# Patient Record
Sex: FEMALE | Race: WHITE | NOT HISPANIC OR LATINO | Employment: FULL TIME | URBAN - METROPOLITAN AREA
[De-identification: names, ages, dates, MRNs, and addresses within clinical notes are randomized per-mention and may not be internally consistent; named-entity substitution may affect disease eponyms.]

---

## 2017-02-14 ENCOUNTER — OFFICE VISIT (OUTPATIENT)
Dept: PSYCHIATRY | Facility: CLINIC | Age: 32
End: 2017-02-14
Payer: COMMERCIAL

## 2017-02-14 VITALS
HEIGHT: 64 IN | SYSTOLIC BLOOD PRESSURE: 132 MMHG | BODY MASS INDEX: 29.37 KG/M2 | HEART RATE: 105 BPM | DIASTOLIC BLOOD PRESSURE: 81 MMHG | WEIGHT: 172 LBS

## 2017-02-14 DIAGNOSIS — F90.0 ADHD, PREDOMINANTLY INATTENTIVE TYPE: Primary | ICD-10-CM

## 2017-02-14 DIAGNOSIS — F43.23 ADJUSTMENT DISORDER WITH MIXED ANXIETY AND DEPRESSED MOOD: ICD-10-CM

## 2017-02-14 DIAGNOSIS — F32.A DEPRESSION, UNSPECIFIED DEPRESSION TYPE: ICD-10-CM

## 2017-02-14 DIAGNOSIS — F41.9 ANXIETY: ICD-10-CM

## 2017-02-14 DIAGNOSIS — F32.81 PMDD (PREMENSTRUAL DYSPHORIC DISORDER): ICD-10-CM

## 2017-02-14 PROCEDURE — 99999 PR PBB SHADOW E&M-EST. PATIENT-LVL III: CPT | Mod: PBBFAC,,, | Performed by: PSYCHIATRY & NEUROLOGY

## 2017-02-14 PROCEDURE — 99214 OFFICE O/P EST MOD 30 MIN: CPT | Mod: S$GLB,,, | Performed by: PSYCHIATRY & NEUROLOGY

## 2017-02-14 RX ORDER — ALBUTEROL SULFATE 108 UG/1
AEROSOL, METERED RESPIRATORY (INHALATION)
Refills: 1 | COMMUNITY
Start: 2016-12-15 | End: 2017-06-26

## 2017-02-14 RX ORDER — METHYLPHENIDATE HYDROCHLORIDE 10 MG/1
10 TABLET ORAL 2 TIMES DAILY
Qty: 60 TABLET | Refills: 0 | Status: SHIPPED | OUTPATIENT
Start: 2017-03-15 | End: 2017-05-04 | Stop reason: SDUPTHER

## 2017-02-14 RX ORDER — OMEPRAZOLE 40 MG/1
1 CAPSULE, DELAYED RELEASE ORAL DAILY
Refills: 2 | COMMUNITY
Start: 2017-02-06

## 2017-02-14 RX ORDER — FLUTICASONE PROPIONATE 50 MCG
2 SPRAY, SUSPENSION (ML) NASAL 2 TIMES DAILY
Refills: 3 | COMMUNITY
Start: 2017-01-25

## 2017-02-14 RX ORDER — VENLAFAXINE HYDROCHLORIDE 75 MG/1
CAPSULE, EXTENDED RELEASE ORAL
Qty: 90 CAPSULE | Refills: 1 | Status: SHIPPED | OUTPATIENT
Start: 2017-02-14 | End: 2017-05-04 | Stop reason: SDUPTHER

## 2017-02-14 NOTE — PROGRESS NOTES
"Outpatient Psychiatry Follow-Up Visit (MD/NP)    2017    Clinical Status of Patient:  Outpatient (Ambulatory)     Session Length:  30 mins (E&M)     Chief Complaint:  Caridad Panda is a 31 y.o. female who presents today for follow-up of anxiety and adjustment problems.    Met with patient.      Interval History and Content of Current Session:  Interim Events/Subjective Report/Content of Current Session:  First f/u with me since appt on 2016 -- please see my note in pt's Epic medical record.      "I am doing OK".  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  She commutes to 2 different sites in [facility] ([facility] and [place]).  She states there is a head dentist and 2 associate dentists.      She has put off taking the DONNA (dental school equivalent of the MCAT).  She states she did not think she was ready to take it.    She states she has also NOT taken the Ritalin yet -- she stated she wanted to see how she would do (at work) without it.  She also has not been studying -- "getting started is so hard".  She has always a hard time concentrating/focusing on studying.  She has denied ever taking a psychostimulant in the past, though her Hx is certainly very suggestive for ADHD, inattentive type (see self report scale from today).  I encouraged her to try the Ritalin 10 mg IR TWICE DAILY, particularly since she needs to study and sit for the DONNA.       Also, her roommate decided to move out last month.  She did not get much notice.  She has been trying to find a new roommate, as she will have a hard time meeting rent.    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      Pt has denied any personal Hx of heart disease.  Regarding family cardiac Hx: her mom has Hx of a-fib and her maternal gm had a recent TIA.    Her father  from an MI in his " 40's and her paternal gf  from MI in his 50's.     Her brother has been evaluated by a cardiologist as a precaution because of family history (he currently has no signs of heart disease).      She continues to take the Effexor XR 75 mg capsule nightly.  She notes compliance.  If she forgets to take the dose the night before, she will have a bad migraine with nausea the next AM.    She also has PMDD -- gets more anxious, emotionally reactive for a few days prior to menses each month.  She takes Prozac 20 mg on those days and thinks it helps (averages 1 week out of 4 that she takes the Prozac).      She has rarely used Vistaril 25 mg prn anxiety.  She thinks it did help when taking it before and did not make her groggy.  She prefers to take this med for anxiety in the future.      She had met with a SW (Estephanie Dunaway [facility]; #[phone]) for psychotherapy (not meeting with her recently due to cost).         Psychotherapy:  N/A.      Review of Systems   GENERAL:  Some recent weight gain   SKIN:  No rashes or lacerations  HEAD:  No headaches  EYES:  No exophthalmos, jaundice or blindness  EARS:  No dizziness, tinnitus or hearing loss  NOSE:  No changes in smell  MOUTH & THROAT:  No dyskinetic movements or obvious goiter  CHEST:  No shortness of breath, hyperventilation or cough  CARDIOVASCULAR:  No tachycardia or chest pain  ABDOMEN:  No nausea, vomiting, pain, constipation or diarrhea  URINARY:  No frequency, dysuria or sexual dysfunction  ENDOCRINE:  No polydipsia, polyuria  MUSCULOSKELETAL:  No pain or stiffness of the joints  NEUROLOGIC:  No weakness, sensory changes, seizures, confusion, memory loss, tremor or other abnormal movements.     Past Psychiatric History:    Took Prozac and Zoloft in past (Prozac as a teen with ADHD).  Also, tried Lexapro, but this med did not work.    She states she has never taken a medication for ADHD.      Past Medical, Family and Social History: The patient's past medical, family  "and social history have been reviewed and updated as appropriate within the electronic medical record -- see encounter notes.    Current Psychotropic Medications:    Effexor XR 75 mg daily  Prozac 20 mg daily as needed (FOR PMDD; TAKES 1 OUT OF 4 WEEKS)   Vistaril 25 mg QID prn anxiety   Ritalin 10 mg IR BID (SEE ABOVE)     Compliance:  Not fully; see above.      Side effects: None    Risk Parameters:  Patient reports no suicidal ideation  Patient reports no homicidal ideation  Patient reports no self-injurious behavior  Patient reports no violent behavior    Exam (detailed: at least 9 elements; comprehensive: all 15 elements)   Constitutional  Vitals:  Most recent vital signs, dated less than 90 days prior to this appointment, were reviewed.     Vitals - 1 value per visit 9/8/2016 12/8/2016 2/14/2017   SYSTOLIC 130 133 132   DIASTOLIC 75 82 81   PULSE 113 109 105   Weight (lb) 157 173 172   HEIGHT 5' 4" 5' 4" 5' 4"   BODY MASS INDEX 26.95 29.7 29.52   VISIT REPORT      Pain Score           Vitals - 1 value per visit 6/16/2015 7/30/2015 4/26/2016   SYSTOLIC 153 140 120   DIASTOLIC 78 73 78   PULSE 106 96    Weight (lb) 172 168.6 161   HEIGHT 5' 4" 5' 3" 5' 4"   BODY MASS INDEX 29.51 29.87 27.64   VISIT REPORT      Pain Score   0 0        General:  unremarkable, age appropriate, well nourished, casually dressed, neatly groomed, pleasant, cooperative, good eye contact, engages well in conversation     Musculoskeletal  Muscle Strength/Tone:  not examined   Gait & Station:  non-ataxic     Psychiatric  Speech:  no latency; no press, spontaneous, good articulation   Mood & Affect:  anxious  congruent and appropriate   Thought Process:  goal-directed, mildly circumstantial   Associations:  mildly circumstantial   Thought Content:  normal, no suicidality, no homicidality, delusions, or paranoia   Insight:  intact, has awareness of illness   Judgement: behavior is adequate to circumstances   Orientation:  grossly intact " "  Memory: intact for content of interview   Language: grossly intact   Attention Span & Concentration:  able to focus   Fund of Knowledge:  intact and appropriate to age and level of education     Assessment and Diagnosis   Status/Progress: Based on the examination today, the patient's problem(s) is/are adequately but not ideally controlled.  New problems have been presented today.   Co-morbidities are not complicating management of the primary condition.  There are no active rule-out diagnoses for this patient at this time.     General Impression:   ADHD, inattentive type  Anxiety Disorder, unspecified  PMDD  Depressive Disorder, unspecified (R/O Hx of dysthymic disorder or MDD)     Intervention/Counseling/Treatment Plan   Medication Management:  I again encouraged pt to try the Ritalin IR 10 mg TWICE DAILY for concentration.  Risks and benefits of psychostimulants were noted, including potential increased anxiety/agitation, tremor, initial insomnia, potential rapid cycling of mood in a bipolar patient, and possible tolerance and dependence to develop.  Also, strict LEIGH regulations for this medication were noted, as well as recommendation to keep the medication secured, as no Rx would be written for patient if the Rx or supply of medication were lost or stolen.  One Rx for Ritalin, for a 30 day supply with no refills & with "Do not fill until" date posted accordingly, was given to patient (pt has a full bottle of this med she has yet to take).   --Continue Prozac 20 mg one capsule daily PRN for PMDD.    --Continue other current medications (Effexor XR, PRN Vistaril).     Return to Clinic: 3 months, or sooner prn.    "

## 2017-05-04 ENCOUNTER — OFFICE VISIT (OUTPATIENT)
Dept: PSYCHIATRY | Facility: CLINIC | Age: 32
End: 2017-05-04
Payer: COMMERCIAL

## 2017-05-04 VITALS
WEIGHT: 178 LBS | DIASTOLIC BLOOD PRESSURE: 80 MMHG | SYSTOLIC BLOOD PRESSURE: 130 MMHG | HEIGHT: 64 IN | BODY MASS INDEX: 30.39 KG/M2 | HEART RATE: 94 BPM

## 2017-05-04 DIAGNOSIS — F32.81 PMDD (PREMENSTRUAL DYSPHORIC DISORDER): ICD-10-CM

## 2017-05-04 DIAGNOSIS — F41.9 ANXIETY: ICD-10-CM

## 2017-05-04 DIAGNOSIS — F32.A DEPRESSION, UNSPECIFIED DEPRESSION TYPE: ICD-10-CM

## 2017-05-04 DIAGNOSIS — F90.0 ADHD, PREDOMINANTLY INATTENTIVE TYPE: Primary | ICD-10-CM

## 2017-05-04 PROCEDURE — 99214 OFFICE O/P EST MOD 30 MIN: CPT | Mod: S$GLB,,, | Performed by: PSYCHIATRY & NEUROLOGY

## 2017-05-04 PROCEDURE — 99999 PR PBB SHADOW E&M-EST. PATIENT-LVL III: CPT | Mod: PBBFAC,,, | Performed by: PSYCHIATRY & NEUROLOGY

## 2017-05-04 PROCEDURE — 1160F RVW MEDS BY RX/DR IN RCRD: CPT | Mod: S$GLB,,, | Performed by: PSYCHIATRY & NEUROLOGY

## 2017-05-04 RX ORDER — METHYLPHENIDATE HYDROCHLORIDE 10 MG/1
10 TABLET ORAL 2 TIMES DAILY
Qty: 60 TABLET | Refills: 0 | Status: SHIPPED | OUTPATIENT
Start: 2017-06-03 | End: 2017-07-19 | Stop reason: SDUPTHER

## 2017-05-04 RX ORDER — METHYLPHENIDATE HYDROCHLORIDE 10 MG/1
10 TABLET ORAL 2 TIMES DAILY
Qty: 60 TABLET | Refills: 0 | Status: SHIPPED | OUTPATIENT
Start: 2017-05-04 | End: 2017-07-19

## 2017-05-04 RX ORDER — VENLAFAXINE HYDROCHLORIDE 75 MG/1
CAPSULE, EXTENDED RELEASE ORAL
Qty: 90 CAPSULE | Refills: 1 | Status: SHIPPED | OUTPATIENT
Start: 2017-05-04 | End: 2017-07-19 | Stop reason: SDUPTHER

## 2017-05-04 NOTE — PROGRESS NOTES
"Outpatient Psychiatry Follow-Up Visit (MD/NP)    5/4/2017    Clinical Status of Patient:  Outpatient (Ambulatory)     Session Length:  30 mins (E&M)     Chief Complaint:  Caridad Panda is a 31 y.o. female who presents today for follow-up of anxiety and adjustment problems.    Met with patient.      Interval History and Content of Current Session:  Interim Events/Subjective Report/Content of Current Session:  First f/u with me since appt on 2/14/2017 -- please see my note in pt's Epic medical record.    Generally doing OK.      She states she has been trying the Ritalin -- she thinks it helps with concentration.    She states she cannot take it after 5 pm because it tends to keep her awake.  It also tends to suppress appetite.    However, she will take it earlier to help her fall asleep     She plans to see a neurologist (Dr. Monahan at [facility]) to help manage her migraine headaches.  She states she had her first migraine headache in 4th grade because of the CA leap test.    At times she has had severe migraines, has had to take ibuprofen, lie down and sleep to break it.      She is studying for the DONNA; she has a study schedule and has signed up for DONAN "boot camp" that is on line.  It has practice tests as well.    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  She commutes to 2 different sites in [facility] ([facility] and [place]).  She states there is a head dentist and 2 associate dentists.      She continues to take the Effexor XR 75 mg capsule nightly.  She notes compliance.  If she forgets to take the dose the night before, she will have a bad migraine with nausea the next AM.    She also has PMDD -- gets more anxious, emotionally reactive for a few days prior to menses each month.  She takes Prozac 20 mg on those days and thinks it helps (averages 1 week out of 4 that she takes the Prozac). "    She has rarely used Vistaril 25 mg prn anxiety.  She thinks it helps and does not make her groggy.  She prefers to take this med for anxiety in the future.      Pt has denied any personal Hx of heart disease.  Regarding family cardiac Hx: her mom has Hx of a-fib and her maternal gm had a recent TIA.    Her father  from an MI in his 40's and her paternal gf  from MI in his 50's.     Her brother has been evaluated by a cardiologist as a precaution because of family history (he currently has no signs of heart disease).      She had met with a SW (Estephanie Dunaway [facility]; #[phone]) for psychotherapy (not meeting with her recently due to cost).         Psychotherapy:  N/A.      Review of Systems   GENERAL:  Some recent weight gain   SKIN:  No rashes or lacerations  HEAD:  No headaches  EYES:  No exophthalmos, jaundice or blindness  EARS:  No dizziness, tinnitus or hearing loss  NOSE:  No changes in smell  MOUTH & THROAT:  No dyskinetic movements or obvious goiter  CHEST:  No shortness of breath, hyperventilation or cough  CARDIOVASCULAR:  No tachycardia or chest pain  ABDOMEN:  No nausea, vomiting, pain, constipation or diarrhea  URINARY:  No frequency, dysuria or sexual dysfunction  ENDOCRINE:  No polydipsia, polyuria  MUSCULOSKELETAL:  No pain or stiffness of the joints  NEUROLOGIC:  No weakness, sensory changes, seizures, confusion, memory loss, tremor or other abnormal movements.     Past Psychiatric History:    Took Prozac and Zoloft in past (Prozac as a teen with ADHD).  Also, tried Lexapro, but this med did not work.      Past Medical, Family and Social History: The patient's past medical, family and social history have been reviewed and updated as appropriate within the electronic medical record -- see encounter notes.    Current Psychotropic Medications:    Effexor XR 75 mg daily  Prozac 20 mg daily as needed (FOR PMDD; TAKES 1 OUT OF 4 WEEKS)   Vistaril 25 mg QID prn anxiety   Ritalin 10 mg IR BID  "(TAKES PRN FOR INATTENTION)    Compliance:  Yes.      Side effects: None    Risk Parameters:  Patient reports no suicidal ideation  Patient reports no homicidal ideation  Patient reports no self-injurious behavior  Patient reports no violent behavior    Exam (detailed: at least 9 elements; comprehensive: all 15 elements)   Constitutional  Vitals:  Most recent vital signs, dated less than 90 days prior to this appointment, were reviewed.     Vitals - 1 value per visit 12/8/2016 2/14/2017 5/4/2017   SYSTOLIC 133 132 130   DIASTOLIC 82 81 80   PULSE 109 105 94   Weight (lb) 173 172 178   Weight (kg) 78.472 78.019 80.74   HEIGHT 5' 4" 5' 4" 5' 4"   BODY MASS INDEX 29.7 29.52 30.55   VISIT REPORT      Pain Score           Vitals - 1 value per visit 6/16/2015 7/30/2015 4/26/2016   SYSTOLIC 153 140 120   DIASTOLIC 78 73 78   PULSE 106 96    Weight (lb) 172 168.6 161   HEIGHT 5' 4" 5' 3" 5' 4"   BODY MASS INDEX 29.51 29.87 27.64   VISIT REPORT      Pain Score   0 0        General:  unremarkable, age appropriate, well nourished, casually dressed, neatly groomed, pleasant, cooperative, good eye contact, engages well in conversation     Musculoskeletal  Muscle Strength/Tone:  not examined   Gait & Station:  non-ataxic     Psychiatric  Speech:  no latency; no press, spontaneous, good articulation   Mood & Affect:  anxious  congruent and appropriate   Thought Process:  goal-directed, mildly circumstantial   Associations:  mildly circumstantial   Thought Content:  normal, no suicidality, no homicidality, delusions, or paranoia   Insight:  intact, has awareness of illness   Judgement: behavior is adequate to circumstances   Orientation:  grossly intact   Memory: intact for content of interview   Language: grossly intact   Attention Span & Concentration:  able to focus   Fund of Knowledge:  intact and appropriate to age and level of education     Assessment and Diagnosis   Status/Progress: Based on the examination today, the " "patient's problem(s) is/are adequately but not ideally controlled.  New problems have been presented today.   Co-morbidities are not complicating management of the primary condition.  There are no active rule-out diagnoses for this patient at this time.     General Impression:   ADHD, inattentive type  Anxiety Disorder, unspecified  PMDD  Depressive Disorder, unspecified (R/O Hx of dysthymic disorder or MDD)     Intervention/Counseling/Treatment Plan   Medication Management:  Continue Ritalin IR 10 mg TWICE DAILY for concentration.  Risks and benefits of psychostimulants were noted, including potential increased anxiety/agitation, tremor, initial insomnia, potential rapid cycling of mood in a bipolar patient, and possible tolerance and dependence to develop.  Also, strict LEIGH regulations for this medication were noted, as well as recommendation to keep the medication secured, as no Rx would be written for patient if the Rx or supply of medication were lost or stolen.  One Rx for Ritalin, for a 30 day supply with no refills & with "Do not fill until" date posted accordingly, was given to patient (pt has a full bottle of this med she has yet to take).   --Continue Prozac 20 mg one capsule daily PRN for PMDD.    --Continue other current medications (Effexor XR, PRN Vistaril).     Return to Clinic: 3 months, or sooner prn.    "

## 2017-05-26 ENCOUNTER — PATIENT MESSAGE (OUTPATIENT)
Dept: OBSTETRICS AND GYNECOLOGY | Facility: CLINIC | Age: 32
End: 2017-05-26

## 2017-06-26 ENCOUNTER — OFFICE VISIT (OUTPATIENT)
Dept: OBSTETRICS AND GYNECOLOGY | Facility: CLINIC | Age: 32
End: 2017-06-26
Payer: COMMERCIAL

## 2017-06-26 VITALS
DIASTOLIC BLOOD PRESSURE: 76 MMHG | SYSTOLIC BLOOD PRESSURE: 118 MMHG | BODY MASS INDEX: 30.48 KG/M2 | HEIGHT: 64 IN | WEIGHT: 178.56 LBS

## 2017-06-26 DIAGNOSIS — Z01.419 VISIT FOR GYNECOLOGIC EXAMINATION: Primary | ICD-10-CM

## 2017-06-26 DIAGNOSIS — N90.89 VULVAR LESION: ICD-10-CM

## 2017-06-26 DIAGNOSIS — Z30.41 ENCOUNTER FOR SURVEILLANCE OF CONTRACEPTIVE PILLS: ICD-10-CM

## 2017-06-26 PROCEDURE — 99395 PREV VISIT EST AGE 18-39: CPT | Mod: S$GLB,,, | Performed by: OBSTETRICS & GYNECOLOGY

## 2017-06-26 PROCEDURE — 99999 PR PBB SHADOW E&M-EST. PATIENT-LVL II: CPT | Mod: PBBFAC,,, | Performed by: OBSTETRICS & GYNECOLOGY

## 2017-06-26 RX ORDER — ZOLMITRIPTAN 5 MG/1
SPRAY, METERED NASAL
COMMUNITY
Start: 2017-05-08 | End: 2019-05-16

## 2017-06-26 RX ORDER — TOPIRAMATE 25 MG/1
CAPSULE, EXTENDED RELEASE ORAL
COMMUNITY
Start: 2017-06-12

## 2017-06-26 NOTE — PROGRESS NOTES
HISTORY OF PRESENT ILLNESS:    Caridad Panda is a 32 y.o. female, , Patient's last menstrual period was 2017.,  presents for a routine exam and has no complaints. PAP DUE .  REFILL OCP  WANTS TO SCHEDULE REMOVAL OF VULVAR SKIN TAG - HAS TWO, BUT THE .5 CM SKIN TAG AT THE CREASE BETWEEN VULVA AND LEFT THIGH IS NOT VERY SYMPTOMATIC.   ADVISED RE REMOVAL OF .5-1 CM FLESHY SKIN TAG ARISING FROM POSTERIOR PERINEUM SINCE IT CATCHES ON HER CLOTHES AND WOULD ADVISE LEEP SINCE IT IS ON A BROAD FLESHY BASE.  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  WILL REFILL OCP - HAS SOME CRAMPING AND FATIGUE PRIOR TO MENSES, ALSO OCCAS H/A.  ONLY BLEEDS 2 DAYS - OFFERED DIFFERENT OCP OR SEASONAL WITHDRAWAL AND DECLINES FOR NOW BUT WILL NOTIFY ME IF SHE CHANFGES HER MIND     LAST VISIT 2015:  C/O AMENORRHEA ON PILLS THIS MONTH - REASSURED. PAP DUE . REFILL OCP. DENTAL SCHOOL.  LAST VISIT 2013:  NEW PATIENT TO ME, ESTABLISHING CARE. C/O DYSMENORRHEA - DISCUSSED NSAIDS  PAP SUBMITTED AND DISCUSSED 3YR SCREENING INTERVAL RECS  WORKS DENTAL SCHOOL    Past Medical History:   Diagnosis Date    Anxiety     Depression        Past Surgical History:   Procedure Laterality Date    HERNIA REPAIR      PATELA REPAIR         MEDICATIONS AND ALLERGIES:      Current Outpatient Prescriptions:     azelastine (ASTELIN) 137 mcg nasal spray, 1 spray by Nasal route 2 (two) times daily as needed for Rhinitis., Disp: , Rfl:     cetirizine (ZYRTEC) 10 MG tablet, Take 10 mg by mouth once daily., Disp: , Rfl:     fluoxetine (PROZAC) 20 MG capsule, Take 1 capsule (20 mg total) by mouth daily as needed (PMDD)., Disp: 30 capsule, Rfl: 2    fluticasone (FLONASE) 50 mcg/actuation nasal spray, 2 sprays by Each Nare route 2 (two) times daily., Disp: , Rfl: 3    hydrOXYzine pamoate (VISTARIL) 25 MG Cap, Take 1 capsule (25 mg total) by mouth 4 (four)  times daily as needed (anxiety)., Disp: 60 capsule, Rfl: 0    methylphenidate (RITALIN) 10 MG tablet, Take 1 tablet (10 mg total) by mouth 2 (two) times daily., Disp: 60 tablet, Rfl: 0    methylphenidate (RITALIN) 10 MG tablet, Take 1 tablet (10 mg total) by mouth 2 (two) times daily., Disp: 60 tablet, Rfl: 0    norethindrone-ethinyl estradiol (CYCLAFEM 1/35, 28,) 1-35 mg-mcg per tablet, Take 1 tablet by mouth once daily., Disp: 84 tablet, Rfl: 3    TROKENDI XR 25 mg Cp24, , Disp: , Rfl:     venlafaxine (EFFEXOR-XR) 75 MG 24 hr capsule, TAKE 1 CAPSULE (75 MG TOTAL) BY MOUTH ONCE DAILY., Disp: 90 capsule, Rfl: 1    omeprazole (PRILOSEC) 40 MG capsule, Take 1 capsule by mouth once daily., Disp: , Rfl: 2    ZOMIG 5 mg Spry, , Disp: , Rfl:     Review of patient's allergies indicates:   Allergen Reactions    Iodine and iodide containing products Itching and Swelling       Family History   Problem Relation Age of Onset    Heart attacks under age 50 Father     Hypertension Father     Cervical cancer Maternal Grandmother     Hypertension Maternal Grandmother     Cancer Maternal Grandmother     Colon cancer Maternal Uncle     Cancer Maternal Uncle     Atrial fibrillation Mother     Hypertension Mother     Hypertension Brother     Cancer Maternal Aunt     Diabetes Maternal Grandfather     Cancer Maternal Grandfather     Breast cancer Neg Hx        Social History     Social History    Marital status: Single     Spouse name: N/A    Number of children: 0    Years of education: N/A     Occupational History    Not on file.     Social History Main Topics    Smoking status: Never Smoker    Smokeless tobacco: Never Used    Alcohol use Yes      Comment: SOCIALLY, 1.5 pitchers of beer every 2 weeks    Drug use: No    Sexual activity: Yes     Partners: Male     Birth control/ protection: OCP     Other Topics Concern    Not on file     Social History Narrative    No narrative on file       COMPREHENSIVE  "GYN HISTORY:  PAP History: Denies abnormal Paps.  Infection History: Denies STDs. Denies PID.  Benign History: Denies uterine fibroids. Denies ovarian cysts. Denies endometriosis. Denies other conditions.  Cancer History: Denies cervical cancer. Denies uterine cancer or hyperplasia. Denies ovarian cancer. Denies vulvar cancer or pre-cancer. Denies vaginal cancer or pre-cancer. Denies breast cancer. Denies colon cancer.  Sexual Activity History: Reports currently being sexually active  Menstrual History: Monthly, mild-moderate.  Contraception:oral contraceptives (estrogen/progesterone)    ROS:  GENERAL: No weight changes. No swelling. No fatigue. No fever.  CARDIOVASCULAR: No chest pain. No shortness of breath. No leg cramps.   NEUROLOGICAL: No headaches. No vision changes.  BREASTS: No pain. No lumps. No discharge.  ABDOMEN: No pain. No nausea. No vomiting. No diarrhea. No constipation.  REPRODUCTIVE: No abnormal bleeding.   VULVA: No pain. No lesions. No itching.  VAGINA: No relaxation. No itching. No odor. No discharge. No lesions.  URINARY: No incontinence. No nocturia. No frequency. No dysuria.    /76   Ht 5' 4" (1.626 m)   Wt 81 kg (178 lb 9.2 oz)   LMP 06/11/2017   Breastfeeding? Unknown   BMI 30.65 kg/m²     PE:  APPEARANCE: Well nourished, well developed, in no acute distress.  AFFECT: WNL, alert and oriented x 3.  SKIN: No acne or hirsutism.  NECK: Neck symmetric, without masses or thyromegaly.  NODES: No inguinal, cervical, axillary or femoral lymph node enlargement.  CHEST: Good respiratory effort.   ABDOMEN: Soft. No tenderness or masses. No hepatosplenomegaly. No hernias.  BREASTS: Symmetrical, no skin changes, visible lesions, palpable masses or nipple discharge bilaterally.  PELVIC: External female genitalia without lesions.  Female hair distribution. Adequate perineal body, Normal urethral meatus. Vagina moist and well rugated without lesions or discharge.  No significant cystocele or " rectocele present. Cervix pink without lesions, discharge or tenderness. Uterus is normal size, regular, mobile and nontender. Adnexa without masses or tenderness.  EXTREMITIES: No edema    PROCEDURES:      DIAGNOSIS:  1. Visit for gynecologic examination     2. Encounter for surveillance of contraceptive pills     3. Vulvar lesion         LABS AND TESTS ORDERED:    MEDICATIONS PRESCRIBED:    COUNSELING:   The patient was counseled today on ACS PAP guidelines, with recommendations for yearly pelvic exams unless their uterus, cervix, and ovaries were removed for benign reasons; in that case, examinations every 3-5 years are recommended.  The patient was also counseled regarding monthly breast self-examination, routine STD screening for at-risk populations, prophylactic immunizations for transmitted infections such as  HPV, Pertussis, or Influenza as appropriate, and yearly mammograms when indicated by ACS guidelines.  She was advised to see her primary care physician for all other health maintenance.    FOLLOW-UP with me annually.

## 2017-07-19 ENCOUNTER — OFFICE VISIT (OUTPATIENT)
Dept: PSYCHIATRY | Facility: CLINIC | Age: 32
End: 2017-07-19
Payer: COMMERCIAL

## 2017-07-19 VITALS
HEIGHT: 64 IN | DIASTOLIC BLOOD PRESSURE: 86 MMHG | HEART RATE: 105 BPM | BODY MASS INDEX: 30.73 KG/M2 | SYSTOLIC BLOOD PRESSURE: 139 MMHG | WEIGHT: 180 LBS

## 2017-07-19 DIAGNOSIS — F90.0 ATTENTION DEFICIT HYPERACTIVITY DISORDER (ADHD), PREDOMINANTLY INATTENTIVE TYPE: ICD-10-CM

## 2017-07-19 DIAGNOSIS — F51.01 PRIMARY INSOMNIA: ICD-10-CM

## 2017-07-19 DIAGNOSIS — F41.9 ANXIETY: ICD-10-CM

## 2017-07-19 DIAGNOSIS — F32.A DEPRESSION, UNSPECIFIED DEPRESSION TYPE: ICD-10-CM

## 2017-07-19 DIAGNOSIS — F32.81 PMDD (PREMENSTRUAL DYSPHORIC DISORDER): ICD-10-CM

## 2017-07-19 DIAGNOSIS — F90.0 ADHD, PREDOMINANTLY INATTENTIVE TYPE: Primary | ICD-10-CM

## 2017-07-19 DIAGNOSIS — F41.9 ANXIETY DISORDER, UNSPECIFIED TYPE: ICD-10-CM

## 2017-07-19 PROCEDURE — 99999 PR PBB SHADOW E&M-EST. PATIENT-LVL III: CPT | Mod: PBBFAC,,, | Performed by: PSYCHIATRY & NEUROLOGY

## 2017-07-19 PROCEDURE — 99214 OFFICE O/P EST MOD 30 MIN: CPT | Mod: S$GLB,,, | Performed by: PSYCHIATRY & NEUROLOGY

## 2017-07-19 PROCEDURE — 90833 PSYTX W PT W E/M 30 MIN: CPT | Mod: S$GLB,,, | Performed by: PSYCHIATRY & NEUROLOGY

## 2017-07-19 RX ORDER — METHYLPHENIDATE HYDROCHLORIDE 10 MG/1
10 TABLET ORAL 2 TIMES DAILY
Qty: 60 TABLET | Refills: 0 | Status: SHIPPED | OUTPATIENT
Start: 2017-08-18 | End: 2017-10-18 | Stop reason: SDUPTHER

## 2017-07-19 RX ORDER — ZALEPLON 10 MG/1
10 CAPSULE ORAL NIGHTLY
Qty: 30 CAPSULE | Refills: 3 | Status: SHIPPED | OUTPATIENT
Start: 2017-07-19

## 2017-07-19 RX ORDER — VENLAFAXINE HYDROCHLORIDE 75 MG/1
CAPSULE, EXTENDED RELEASE ORAL
Qty: 90 CAPSULE | Refills: 1 | Status: SHIPPED | OUTPATIENT
Start: 2017-07-19 | End: 2017-10-18 | Stop reason: SDUPTHER

## 2017-07-19 RX ORDER — METHYLPHENIDATE HYDROCHLORIDE 10 MG/1
10 TABLET ORAL 2 TIMES DAILY
Qty: 60 TABLET | Refills: 0 | Status: SHIPPED | OUTPATIENT
Start: 2017-07-19 | End: 2017-10-18 | Stop reason: SDUPTHER

## 2017-07-19 RX ORDER — HYDROXYZINE PAMOATE 25 MG/1
25 CAPSULE ORAL 4 TIMES DAILY PRN
Qty: 60 CAPSULE | Refills: 3 | Status: SHIPPED | OUTPATIENT
Start: 2017-07-19 | End: 2018-08-22 | Stop reason: SDUPTHER

## 2017-07-19 RX ORDER — TRAZODONE HYDROCHLORIDE 100 MG/1
100 TABLET ORAL NIGHTLY PRN
Qty: 30 TABLET | Refills: 3 | Status: SHIPPED | OUTPATIENT
Start: 2017-07-19 | End: 2018-01-10

## 2017-07-19 RX ORDER — METHYLPHENIDATE HYDROCHLORIDE 10 MG/1
10 TABLET ORAL 2 TIMES DAILY
Qty: 60 TABLET | Refills: 0 | Status: SHIPPED | OUTPATIENT
Start: 2017-09-17 | End: 2017-10-18 | Stop reason: SDUPTHER

## 2017-07-19 NOTE — PROGRESS NOTES
Outpatient Psychiatry Follow-Up Visit (MD/NP)    7/19/2017    Clinical Status of Patient:  Outpatient (Ambulatory)     Session Length:  45 mins (E&M plus psychotherapy)     Chief Complaint:  Caridad aPnda is a 32 y.o. female who presents today for follow-up of anxiety and adjustment problems.    Met with patient.      Interval History and Content of Current Session:  Interim Events/Subjective Report/Content of Current Session:  First f/u with me since appt on 5/4/2017 -- please see my note in pt's Epic medical record.    Generally doing OK.      She had the DONNA scheduled for 7/29/17.  She pushed it back to 8/29/17 because she did not think she was ready.  She still has trepidation to take the test (fear of failure).  She also plans to submit her application towards 2 dental schools: [employer] and [employer]; she plans to apply even before she gets her DONNA scores back.  She thinks she needs to score at least a 20 out of 30 on the DONNA.  She scored an 18 when she originally took the exam (scores have increased).    Ritalin helps well with concentration and studying.  She denies AE's.      She notes work overall is better, but it is still very difficult.  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  She commutes to 2 different sites in [facility] ([facility] and [place]).  She states there is a head dentist and 2 associate dentists.    When she has a stressful day at work, she is too mentally and physically wiped out to study.    She has had problems falling and staying asleep.  She also has dreams about work; she tends to have anxious dreams.      She notes some of the stress/struggles she has had to deal with at work lately -- self disclosure noted.      She has problems falling asleep.   She also awakens and cannot go back to sleep.  She also awakens multiple times at night due to multiple  reasons.    She also snores, likely has JAVIER.  Her mom and brother also have JAVIER.  She is checking into getting a sleep study -- I encouraged her to do so.      We discussed med options for sleep: could take 2 of the Vistaril 25 mg caps, or 50 mg; or, could try trazodone; or, could try Sonata either at the beginning or in the middle of the night as long as she could sleep for at least 4 hours after dosing.    Pt asked for printed scripts for both the trazodone and Sonata so she could decide which of them she would like to try (or, she could just try Vistaril instead).      She continues to take the Effexor XR 75 mg capsule nightly.  She notes compliance.  If she forgets to take the dose the night before, she will have a bad migraine with nausea the next AM.    She also has PMDD -- gets more anxious, emotionally reactive for a few days prior to menses each month.  She takes Prozac 20 mg on those days and thinks it helps (averages 1 week out of 4 that she takes the Prozac).    She has rarely used Vistaril 25 mg prn anxiety.  She thinks it relax her and does not make her groggy.       Pt has denied any personal Hx of heart disease.  Regarding family cardiac Hx: her mom has Hx of a-fib and her maternal gm had a recent TIA.    Her father  from an MI in his 40's and her paternal gf  from MI in his 50's.     Her brother has been evaluated by a cardiologist as a precaution because of family history (he currently has no signs of heart disease).      She sees Dr. Monahan (neurologist) at [facility] to help manage her migraine headaches.  She states she had her first migraine headache in 4th grade.    At times she has had severe migraines, has had to take ibuprofen, lie down and sleep to break it.      She had met with a EMIR (Estephan[facility]; #[phone]) for psychotherapy (not meeting with her recently due to cost).           PSYCHOTHERAPY ADD-ON [id number]   30 (16-37*) minutes    Site: [facility], [address]  Time: 20 minutes  Participants: Met with patient    Therapeutic Intervention Type: insight oriented psychotherapy, supportive psychotherapy  Why chosen therapy is appropriate versus another modality: relevant to diagnosis, patient responds to this modality    Target symptoms: depression, distractability, lack of focus, anxiety , adjustment, work stress  Primary focus: see above  Psychotherapeutic techniques: encouraged self-disclosure, active listening and feedback, reframing, encouragement of self care    Outcome monitoring methods: self-report, lab data, observation    Patient's response to intervention:  The patient's response to intervention is motivated.    Progress toward goals:  The patient's progress toward goals is fair .      Review of Systems   GENERAL:  Some recent weight gain   SKIN:  No rashes or lacerations  HEAD:  No headaches  EYES:  No exophthalmos, jaundice or blindness  EARS:  No dizziness, tinnitus or hearing loss  NOSE:  No changes in smell  MOUTH & THROAT:  No dyskinetic movements or obvious goiter  CHEST:  No shortness of breath, hyperventilation or cough  CARDIOVASCULAR:  No tachycardia or chest pain  ABDOMEN:  No nausea, vomiting, pain, constipation or diarrhea  URINARY:  No frequency, dysuria or sexual dysfunction  ENDOCRINE:  No polydipsia, polyuria  MUSCULOSKELETAL:  No pain or stiffness of the joints  NEUROLOGIC:  No weakness, sensory changes, seizures, confusion, memory loss, tremor or other abnormal movements.     Past Psychiatric History:  Took Prozac and Zoloft in past (Prozac as a teen with ADHD).  Also, tried Lexapro, but this med did not work.      Past Medical, Family and Social History: The patient's past medical, family and social history have been reviewed and updated as appropriate within the electronic medical record -- see encounter notes.    Current Psychotropic Medications:    Effexor XR 75 mg daily  Prozac 20 mg daily as needed (FOR PMDD; TAKES 1 OUT OF 4  "WEEKS)   Vistaril 25 mg QID prn anxiety   Ritalin 10 mg IR BID (TAKES PRN FOR INATTENTION)    Compliance:  Yes.      Side effects: None    Risk Parameters:  Patient reports no suicidal ideation  Patient reports no homicidal ideation  Patient reports no self-injurious behavior  Patient reports no violent behavior    Exam (detailed: at least 9 elements; comprehensive: all 15 elements)   Constitutional  Vitals:  Most recent vital signs, dated less than 90 days prior to this appointment, were reviewed.     Vitals - 1 value per visit 6/26/2017 7/19/2017   SYSTOLIC 118 139   DIASTOLIC 76 86   PULSE  105   Weight (lb) 178.57 180   Weight (kg) 81 81.647   HEIGHT 5' 4" 5' 4"   BODY MASS INDEX 30.65 30.9   VISIT REPORT     Pain Score  0        Vitals - 1 value per visit 12/8/2016 2/14/2017 5/4/2017   SYSTOLIC 133 132 130   DIASTOLIC 82 81 80   PULSE 109 105 94   Weight (lb) 173 172 178   Weight (kg) 78.472 78.019 80.74   HEIGHT 5' 4" 5' 4" 5' 4"   BODY MASS INDEX 29.7 29.52 30.55   VISIT REPORT      Pain Score           Vitals - 1 value per visit 6/16/2015 7/30/2015 4/26/2016   SYSTOLIC 153 140 120   DIASTOLIC 78 73 78   PULSE 106 96    Weight (lb) 172 168.6 161   HEIGHT 5' 4" 5' 3" 5' 4"   BODY MASS INDEX 29.51 29.87 27.64   VISIT REPORT      Pain Score   0 0        General:  unremarkable, age appropriate, well nourished, casually dressed, neatly groomed, pleasant, cooperative, good eye contact, engages well in conversation     Musculoskeletal  Muscle Strength/Tone:  not examined   Gait & Station:  non-ataxic     Psychiatric  Speech:  no latency; no press, spontaneous, good articulation   Mood & Affect:  anxious  congruent and appropriate   Thought Process:  goal-directed, mildly circumstantial   Associations:  mildly circumstantial   Thought Content:  normal, no suicidality, no homicidality, delusions, or paranoia   Insight:  intact, has awareness of illness   Judgement: behavior is adequate to circumstances   Orientation:  " grossly intact   Memory: intact for content of interview   Language: grossly intact   Attention Span & Concentration:  able to focus   Fund of Knowledge:  intact and appropriate to age and level of education     Assessment and Diagnosis   Status/Progress: Based on the examination today, the patient's problem(s) is/are adequately but not ideally controlled.  New problems have been presented today.   Co-morbidities are not complicating management of the primary condition.  There are no active rule-out diagnoses for this patient at this time.     General Impression:   ADHD, inattentive type  Anxiety Disorder, unspecified  PMDD  Depressive Disorder, unspecified (R/O Hx of dysthymic disorder or MDD)     Intervention/Counseling/Treatment Plan   Medication Management:  Pt can try 50 mg of Vistaril for sleep.  If not effective, can try trazodone 100 mg 1/2 - 1 tablet for sleep AND/OR Sonata 10 mg one capsule for sleep (initial or terminal insomnia).  Benefits/risks of these meds were noted, including possible orthostatic hypotension and, with Sonata, patient informed sedation and dizziness may occur.  Patient told not to drive or operate heavy machinery after taking these medications for sleep.  Pt also told that there is a slight risk of blackouts occurring with medications like Sonata and to avoid combining alcohol with these meds.  Pt told she can take this med as long as she can sleep for at least 4 hours after dosing.   --Continue Ritalin IR 10 mg TWICE DAILY for concentration.  Risks and benefits of psychostimulants have been noted, including potential increased anxiety/agitation, tremor, initial insomnia, potential rapid cycling of mood in a bipolar patient, and possible tolerance and dependence to develop.  Also, strict LEIGH regulations for this medication have been noted, as well as recommendation to keep the medication secured, as no Rx would be written for patient if the Rx or supply of medication were lost or  "stolen.  Three Rx for Ritalin, for a 30 day supply with no refills & with "Do not fill until" date posted accordingly, were given to patient.   --Continue Prozac 20 mg one capsule daily PRN for PMDD.    --Continue other current medications (Effexor XR, PRN Vistaril).     Return to Clinic: 3 months, or sooner prn.    "

## 2017-10-18 ENCOUNTER — OFFICE VISIT (OUTPATIENT)
Dept: PSYCHIATRY | Facility: CLINIC | Age: 32
End: 2017-10-18
Payer: COMMERCIAL

## 2017-10-18 VITALS
HEIGHT: 64 IN | DIASTOLIC BLOOD PRESSURE: 83 MMHG | BODY MASS INDEX: 30.73 KG/M2 | HEART RATE: 84 BPM | SYSTOLIC BLOOD PRESSURE: 137 MMHG | WEIGHT: 180 LBS

## 2017-10-18 DIAGNOSIS — F32.81 PMDD (PREMENSTRUAL DYSPHORIC DISORDER): ICD-10-CM

## 2017-10-18 DIAGNOSIS — F90.0 ADHD, PREDOMINANTLY INATTENTIVE TYPE: Primary | ICD-10-CM

## 2017-10-18 DIAGNOSIS — F32.A DEPRESSION, UNSPECIFIED DEPRESSION TYPE: ICD-10-CM

## 2017-10-18 DIAGNOSIS — F90.0 ATTENTION DEFICIT HYPERACTIVITY DISORDER (ADHD), PREDOMINANTLY INATTENTIVE TYPE: ICD-10-CM

## 2017-10-18 DIAGNOSIS — F41.9 ANXIETY DISORDER, UNSPECIFIED TYPE: ICD-10-CM

## 2017-10-18 DIAGNOSIS — F41.9 ANXIETY: ICD-10-CM

## 2017-10-18 PROCEDURE — 90833 PSYTX W PT W E/M 30 MIN: CPT | Mod: S$GLB,,, | Performed by: PSYCHIATRY & NEUROLOGY

## 2017-10-18 PROCEDURE — 99999 PR PBB SHADOW E&M-EST. PATIENT-LVL II: CPT | Mod: PBBFAC,,, | Performed by: PSYCHIATRY & NEUROLOGY

## 2017-10-18 PROCEDURE — 99213 OFFICE O/P EST LOW 20 MIN: CPT | Mod: S$GLB,,, | Performed by: PSYCHIATRY & NEUROLOGY

## 2017-10-18 RX ORDER — METHYLPHENIDATE HYDROCHLORIDE 10 MG/1
10 TABLET ORAL 2 TIMES DAILY
Qty: 60 TABLET | Refills: 0 | Status: SHIPPED | OUTPATIENT
Start: 2017-12-16 | End: 2017-11-15

## 2017-10-18 RX ORDER — VENLAFAXINE HYDROCHLORIDE 75 MG/1
CAPSULE, EXTENDED RELEASE ORAL
Qty: 90 CAPSULE | Refills: 1 | Status: SHIPPED | OUTPATIENT
Start: 2017-10-18 | End: 2018-01-10 | Stop reason: SDUPTHER

## 2017-10-18 RX ORDER — METHYLPHENIDATE HYDROCHLORIDE 10 MG/1
10 TABLET ORAL 2 TIMES DAILY
Qty: 60 TABLET | Refills: 0 | Status: SHIPPED | OUTPATIENT
Start: 2017-11-17 | End: 2017-11-15

## 2017-10-18 RX ORDER — METHYLPHENIDATE HYDROCHLORIDE 10 MG/1
10 TABLET ORAL 2 TIMES DAILY
Qty: 60 TABLET | Refills: 0 | Status: SHIPPED | OUTPATIENT
Start: 2017-10-18 | End: 2018-01-10 | Stop reason: SDUPTHER
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
## 2017-11-15 ENCOUNTER — OFFICE VISIT (OUTPATIENT)
Dept: OBSTETRICS AND GYNECOLOGY | Facility: CLINIC | Age: 32
End: 2017-11-15
Payer: COMMERCIAL

## 2017-11-15 VITALS
SYSTOLIC BLOOD PRESSURE: 130 MMHG | HEIGHT: 64 IN | BODY MASS INDEX: 30.9 KG/M2 | DIASTOLIC BLOOD PRESSURE: 80 MMHG | WEIGHT: 181 LBS

## 2017-11-15 DIAGNOSIS — N90.89 VULVAR LESION: Primary | ICD-10-CM

## 2017-11-15 PROCEDURE — 88305 TISSUE EXAM BY PATHOLOGIST: CPT | Performed by: PATHOLOGY

## 2017-11-15 PROCEDURE — 99499 UNLISTED E&M SERVICE: CPT | Mod: S$GLB,,, | Performed by: OBSTETRICS & GYNECOLOGY

## 2017-11-15 PROCEDURE — 56606 BIOPSY OF VULVA/PERINEUM: CPT | Mod: S$GLB,,, | Performed by: OBSTETRICS & GYNECOLOGY

## 2017-11-15 PROCEDURE — 99999 PR PBB SHADOW E&M-EST. PATIENT-LVL III: CPT | Mod: PBBFAC,,, | Performed by: OBSTETRICS & GYNECOLOGY

## 2017-11-15 PROCEDURE — 56605 BIOPSY OF VULVA/PERINEUM: CPT | Mod: S$GLB,,, | Performed by: OBSTETRICS & GYNECOLOGY

## 2017-11-15 NOTE — PROGRESS NOTES
VULVAR BIOPSY:    Caridad Panda is a 32 y.o. female , presents for a vulva biopsy due to vulvar lesions.    PRE VULVA BIOPSY COUNSELING:  The patient was informed of the risk of bleeding, pain and infection. She was counseled on the alternatives to vulva biopsy and agrees to proceed.     EXAM:  There  Are 2 raised FLESHY lesions of the perineum AS DESCRIBED IN MY NOTE FROM LAST VISIT 2017:  PAP DUE .  REFILL OCP  WANTS TO SCHEDULE REMOVAL OF VULVAR SKIN TAG - HAS TWO, BUT THE .5 CM SKIN TAG AT THE CREASE BETWEEN VULVA AND LEFT THIGH IS NOT VERY SYMPTOMATIC.   ADVISED RE REMOVAL OF .5-1 CM FLESHY SKIN TAG ARISING FROM POSTERIOR PERINEUM SINCE IT CATCHES ON HER CLOTHES AND WOULD ADVISE LEEP SINCE IT IS ON A BROAD FLESHY BASE.  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  WILL REFILL OCP - HAS SOME CRAMPING AND FATIGUE PRIOR TO MENSES, ALSO OCCAS H/A.  ONLY BLEEDS 2 DAYS - OFFERED DIFFERENT OCP OR SEASONAL WITHDRAWAL AND DECLINES FOR NOW BUT WILL NOTIFY ME IF SHE CHANFGES HER MIND.    PROCEDURE:  A time out was performed.  CONSENTS OBTAINED  The lesion and surrounding area were prepped with CHG swabs times three.  The base of the lesions were injected with 1 cc of 1% Xylocaine with epinephrine.     BOTH OF THE SKIN TAGS WERE REMOVED USING MONOPOLAR CAUTERY ACROSS THE BASE WITH GOOD HEMOSTASIS.    ASSESSMENT:  1. Vulva Lesion / Biopsy.    POST VULVA BIOPSY COUNSELING:  Manage post biopsy pain with NSAIDs, Tylenol or Rx per [employer]. APPLY ZINC OXIDE AS NEEDED  Expect minimal spotting and black discharge from silver nitrite and/or Monsels solution.  Take warm sitz baths TID; dry with hair dryer on cool setting 12 inches away until  healed.  Report foul smelling drainage, heavy bleeding or redness at biopsy site, fever > 101.0 F, worsening pain.    FOLLOW-UP: pending biopsy results.

## 2017-11-24 ENCOUNTER — PATIENT MESSAGE (OUTPATIENT)
Dept: OBSTETRICS AND GYNECOLOGY | Facility: CLINIC | Age: 32
End: 2017-11-24

## 2018-01-10 ENCOUNTER — OFFICE VISIT (OUTPATIENT)
Dept: PSYCHIATRY | Facility: CLINIC | Age: 33
End: 2018-01-10
Payer: COMMERCIAL

## 2018-01-10 VITALS
DIASTOLIC BLOOD PRESSURE: 78 MMHG | BODY MASS INDEX: 31.24 KG/M2 | SYSTOLIC BLOOD PRESSURE: 135 MMHG | WEIGHT: 183 LBS | HEART RATE: 99 BPM | HEIGHT: 64 IN

## 2018-01-10 DIAGNOSIS — F32.81 PMDD (PREMENSTRUAL DYSPHORIC DISORDER): ICD-10-CM

## 2018-01-10 DIAGNOSIS — F90.0 ATTENTION DEFICIT HYPERACTIVITY DISORDER (ADHD), PREDOMINANTLY INATTENTIVE TYPE: ICD-10-CM

## 2018-01-10 DIAGNOSIS — F32.A DEPRESSION, UNSPECIFIED DEPRESSION TYPE: ICD-10-CM

## 2018-01-10 DIAGNOSIS — F41.9 ANXIETY: ICD-10-CM

## 2018-01-10 DIAGNOSIS — F90.0 ADHD, PREDOMINANTLY INATTENTIVE TYPE: Primary | ICD-10-CM

## 2018-01-10 PROCEDURE — 99999 PR PBB SHADOW E&M-EST. PATIENT-LVL III: CPT | Mod: PBBFAC,,, | Performed by: PSYCHIATRY & NEUROLOGY

## 2018-01-10 PROCEDURE — 99213 OFFICE O/P EST LOW 20 MIN: CPT | Mod: S$GLB,,, | Performed by: PSYCHIATRY & NEUROLOGY

## 2018-01-10 PROCEDURE — 90833 PSYTX W PT W E/M 30 MIN: CPT | Mod: S$GLB,,, | Performed by: PSYCHIATRY & NEUROLOGY

## 2018-01-10 RX ORDER — METHYLPHENIDATE HYDROCHLORIDE 10 MG/1
10 TABLET ORAL 2 TIMES DAILY
Qty: 60 TABLET | Refills: 0 | Status: SHIPPED | OUTPATIENT
Start: 2018-03-10 | End: 2018-03-26 | Stop reason: SDUPTHER

## 2018-01-10 RX ORDER — METHYLPHENIDATE HYDROCHLORIDE 10 MG/1
10 TABLET ORAL 2 TIMES DAILY
Qty: 60 TABLET | Refills: 0 | Status: SHIPPED | OUTPATIENT
Start: 2018-02-09 | End: 2018-03-26 | Stop reason: SDUPTHER

## 2018-01-10 RX ORDER — METHYLPHENIDATE HYDROCHLORIDE 10 MG/1
10 TABLET ORAL 2 TIMES DAILY
Qty: 60 TABLET | Refills: 0 | Status: SHIPPED | OUTPATIENT
Start: 2018-01-10 | End: 2018-03-26 | Stop reason: SDUPTHER

## 2018-01-10 RX ORDER — VENLAFAXINE HYDROCHLORIDE 75 MG/1
CAPSULE, EXTENDED RELEASE ORAL
Qty: 90 CAPSULE | Refills: 1 | Status: SHIPPED | OUTPATIENT
Start: 2018-01-10 | End: 2018-03-26 | Stop reason: SDUPTHER

## 2018-01-10 NOTE — PROGRESS NOTES
"Outpatient Psychiatry Follow-Up Visit (MD/NP)    1/10/2018    Clinical Status of Patient:  Outpatient (Ambulatory)     Session Length:  30 mins (E&M plus psychotherapy)     Chief Complaint:  Caridad Panda is a 32 y.o. female who presents today for follow-up of anxiety and adjustment problems.    Met with patient.      Interval History and Content of Current Session:  Interim Events/Subjective Report/Content of Current Session:  First f/u with me since appt on 10/18/2017 -- please see my note in pt's Epic medical record.    Generally doing OK.    Holidays were OK.      She has been looking for another job.  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  She states her main boss is "just not nice".  He blames his mistakes on her and others in the workplace.  He also procrastinates and will not order supplies when they need them.    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  "I did not seek out a  position".     She states she has made some mistakes; all of them were correctable and all in regards to being an , which she states it not what she is best at or what she wanted to do.    Pt thinks some of the problems she has had with people supervising her is that she may not come across in a good way when she asks questions -- "maybe it's the way I ask, or my body language".  Several times in the past she thinks past and current supervisors have become irritated with her because she may come across as questioning their authority.     Lately, she has been minding "my P's and Q's" with her supervisor.  She knows if she leaves the office, she wants to leave on her own terms.        She has been studying and wants to take the DONNA again soon (perhaps at end of the month?).  She scored a 17 the last time she took this test, which is below the score she needed.    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    She states the Ritalin did help her focus better and she thought she was more relaxed and mentally sharp to take the test.    She is still living in [place].      Ritalin has helped well with concentration and studying.  She denies AE's.    She has been using the trazodone for sleep.  Occasionally may use Sonata to go back to sleep.    She snores, likely has JAVIER.  Her mom and brother also have JAVIER.  I have encouraged her to see a sleep specialist.  She continues to take the Effexor XR 75 mg capsule nightly.  She notes compliance.  If she forgets to take the dose the night before, she will have a bad migraine with nausea the next AM.    She also has PMDD -- gets more anxious, emotionally reactive for a few days prior to menses each month.  She takes Prozac 20 mg on those days and thinks it helps (averages about 5 - 7 days a month that she takes the Prozac).     She sees Dr. Monahan (neurologist) at [facility] to help manage her migraine headaches.  She states she had her first migraine headache in 4th grade.    At times she has had severe migraines, has had to take ibuprofen, lie down and sleep to break it.      Pt has denied any personal Hx of heart disease.  Regarding family cardiac Hx: her mom has Hx of a-fib and her maternal gm had a recent TIA.    Her father  from an MI in his 40's and her paternal gf  from MI in his 50's.     Her brother has been evaluated by a cardiologist as a precaution because of family history (he currently has no signs of heart disease).      She had met with a SW (Estephanie Dunaway, [facility]; #[phone]) for psychotherapy (not meeting with her recently due to cost).         PSYCHOTHERAPY ADD-ON [id number]   30 (16-37*) minutes    Site: [facility], [address]  Time: 20 minutes  Participants: Met with patient    Therapeutic Intervention Type: insight oriented psychotherapy, supportive  psychotherapy  Why chosen therapy is appropriate versus another modality: relevant to diagnosis, patient responds to this modality    Target symptoms: depression, distractability, lack of focus, anxiety , adjustment, work stress  Primary focus: see above  Psychotherapeutic techniques: encouraged self-disclosure, active listening and feedback, reframing, encouragement of self care    Outcome monitoring methods: self-report, lab data, observation    Patient's response to intervention:  The patient's response to intervention is motivated.    Progress toward goals:  The patient's progress toward goals is fair .      Review of Systems   GENERAL:  Some recent weight gain   SKIN:  No rashes or lacerations  HEAD:  No headaches  EYES:  No exophthalmos, jaundice or blindness  EARS:  No dizziness, tinnitus or hearing loss  NOSE:  No changes in smell  MOUTH & THROAT:  No dyskinetic movements or obvious goiter  CHEST:  No shortness of breath, hyperventilation or cough  CARDIOVASCULAR:  No tachycardia or chest pain  ABDOMEN:  No nausea, vomiting, pain, constipation or diarrhea  URINARY:  No frequency, dysuria or sexual dysfunction  ENDOCRINE:  No polydipsia, polyuria  MUSCULOSKELETAL:  No pain or stiffness of the joints  NEUROLOGIC:  No weakness, sensory changes, seizures, confusion, memory loss, tremor or other abnormal movements.     Past Psychiatric History:  Took Prozac and Zoloft in past (Prozac as a teen with ADHD).  Also, tried Lexapro, but this med did not work.      Past Medical, Family and Social History: The patient's past medical, family and social history have been reviewed and updated as appropriate within the electronic medical record -- see encounter notes.    Current Psychotropic Medications:    Effexor XR 75 mg daily  Prozac 20 mg daily as needed (FOR PMDD; TAKES 1 OUT OF 4 WEEKS)   Vistaril 25 mg QID prn anxiety   Ritalin 10 mg IR BID (TAKES PRN FOR INATTENTION)    Compliance:  Yes.      Side effects:  "None    Risk Parameters:  Patient reports no suicidal ideation  Patient reports no homicidal ideation  Patient reports no self-injurious behavior  Patient reports no violent behavior    Exam (detailed: at least 9 elements; comprehensive: all 15 elements)   Constitutional  Vitals:  Most recent vital signs, dated less than 90 days prior to this appointment, were reviewed.     Vitals - 1 value per visit 11/15/2017 1/10/2018   SYSTOLIC 130 135   DIASTOLIC 80 78   PULSE  99   Weight (lb) 181 183   Weight (kg) 82.1 83.008   HEIGHT 5' 4" 5' 4"   BODY MASS INDEX 31.07 31.41   VISIT REPORT     Pain Score  0        Vitals - 1 value per visit 6/26/2017 7/19/2017 10/18/2017   SYSTOLIC 118 139 137   DIASTOLIC 76 86 83   PULSE  105 84   Weight (lb) 178.57 180 180   Weight (kg) 81 81.647 81.647   HEIGHT 5' 4" 5' 4" 5' 4"   BODY MASS INDEX 30.65 30.9 30.9   VISIT REPORT      Pain Score  0         Vitals - 1 value per visit 12/8/2016 2/14/2017 5/4/2017   SYSTOLIC 133 132 130   DIASTOLIC 82 81 80   PULSE 109 105 94   Weight (lb) 173 172 178   Weight (kg) 78.472 78.019 80.74   HEIGHT 5' 4" 5' 4" 5' 4"   BODY MASS INDEX 29.7 29.52 30.55   VISIT REPORT      Pain Score           Vitals - 1 value per visit 6/16/2015 7/30/2015 4/26/2016   SYSTOLIC 153 140 120   DIASTOLIC 78 73 78   PULSE 106 96    Weight (lb) 172 168.6 161   HEIGHT 5' 4" 5' 3" 5' 4"   BODY MASS INDEX 29.51 29.87 27.64   VISIT REPORT      Pain Score   0 0        General:  unremarkable, age appropriate, well nourished, casually dressed, neatly groomed, pleasant, cooperative, good eye contact, engages well in conversation     Musculoskeletal  Muscle Strength/Tone:  not examined   Gait & Station:  non-ataxic     Psychiatric  Speech:  no latency; no press, spontaneous, good articulation   Mood & Affect:  mildly anxious  congruent and appropriate   Thought Process:  goal-directed, mildly circumstantial   Associations:  mildly circumstantial   Thought Content:  normal, no " "suicidality, no homicidality, delusions, or paranoia   Insight:  intact, has awareness of illness   Judgement: behavior is adequate to circumstances   Orientation:  grossly intact   Memory: intact for content of interview   Language: grossly intact   Attention Span & Concentration:  able to focus   Fund of Knowledge:  intact and appropriate to age and level of education     Assessment and Diagnosis   Status/Progress: Based on the examination today, the patient's problem(s) is/are adequately but not ideally controlled.  New problems have been presented today.   Co-morbidities are not complicating management of the primary condition.  There are no active rule-out diagnoses for this patient at this time.     General Impression:   ADHD, inattentive type  Anxiety Disorder, unspecified  PMDD  Depressive Disorder, unspecified (R/O Hx of dysthymic disorder or MDD)     Intervention/Counseling/Treatment Plan   Medication Management:  Continue Ritalin IR 10 mg TWICE DAILY as needed for concentration.  Risks and benefits of psychostimulants have been noted, including potential increased anxiety/agitation, tremor, initial insomnia, potential rapid cycling of mood in a bipolar patient, and possible tolerance and dependence to develop.  Also, strict LEIGH regulations for this medication have been noted, as well as recommendation to keep the medication secured, as no Rx would be written for patient if the Rx or supply of medication were lost or stolen.  Three Rx for Ritalin, for a 30 day supply with no refills & with "Do not fill until" date posted accordingly, were given to patient.   --Continue Prozac 20 mg one capsule daily PRN for PMDD.    --Continue other current medications as noted above.     Return to Clinic: 3 months, or sooner prn.    "

## 2018-01-15 ENCOUNTER — OFFICE VISIT (OUTPATIENT)
Dept: OBSTETRICS AND GYNECOLOGY | Facility: CLINIC | Age: 33
End: 2018-01-15
Payer: COMMERCIAL

## 2018-01-15 VITALS
BODY MASS INDEX: 31.12 KG/M2 | WEIGHT: 182.31 LBS | SYSTOLIC BLOOD PRESSURE: 116 MMHG | DIASTOLIC BLOOD PRESSURE: 88 MMHG | HEIGHT: 64 IN

## 2018-01-15 DIAGNOSIS — Z09 POSTOP CHECK: Primary | ICD-10-CM

## 2018-01-15 PROCEDURE — 99999 PR PBB SHADOW E&M-EST. PATIENT-LVL III: CPT | Mod: PBBFAC,,, | Performed by: OBSTETRICS & GYNECOLOGY

## 2018-01-15 PROCEDURE — 99024 POSTOP FOLLOW-UP VISIT: CPT | Mod: S$GLB,,, | Performed by: OBSTETRICS & GYNECOLOGY

## 2018-01-15 NOTE — PROGRESS NOTES
POSTOPERATIVE FOLLOW-UP:    The patient presents today following 11/15/2017 LEEP 2 VULVAR SKIN TAGE (PATH BENIGN).  There are no complaints, andthe procedure was uncomplicated.    LAST ROUTINE VISIT 6/2017:  PAP DUE 2019.  REFILL OCP  WANTS TO SCHEDULE REMOVAL OF VULVAR SKIN TAG - HAS TWO, BUT THE .5 CM SKIN TAG AT THE CREASE BETWEEN VULVA AND LEFT THIGH IS NOT VERY SYMPTOMATIC.   ADVISED RE REMOVAL OF .5-1 CM FLESHY SKIN TAG ARISING FROM POSTERIOR PERINEUM SINCE IT CATCHES ON HER CLOTHES AND WOULD ADVISE LEEP SINCE IT IS ON A BROAD FLESHY BASE.  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  WILL REFILL OCP - HAS SOME CRAMPING AND FATIGUE PRIOR TO MENSES, ALSO OCCAS H/A.  ONLY BLEEDS 2 DAYS - OFFERED DIFFERENT OCP OR SEASONAL WITHDRAWAL AND DECLINES FOR NOW BUT WILL NOTIFY ME IF SHE CHANFGES HER MIND.      PHYSICAL EXAM:  Appearance: Well developed, well nourished, in no acute distress.  Skin: Normal turgor and no visible lesions.  Abdomen:  Soft, non tender, non distended, without hepatosplenomegaly, No masses appreciated.    Pelvic: Normal female external genitalia without lesions, ELL-HEALED.  Normal hair distribution.  Adequate perineal body and normal urethral meatus.  Bimanual examination DEFERRED.    ASSESSMENT:  Doing well following her procedure.  She is following the anticipated course of recovery, and was advised regarding future wound care, activity, and need for follow up.      PLAN:  Follow up next routine visit.  The patient is advised to call if she has fever greater than 101.0F, increased bleeding/discharge, new-onset or increased pain, deviation from the anticipated subsequent course of recovery, questions, or concerns.

## 2018-03-26 ENCOUNTER — OFFICE VISIT (OUTPATIENT)
Dept: PSYCHIATRY | Facility: CLINIC | Age: 33
End: 2018-03-26
Payer: COMMERCIAL

## 2018-03-26 VITALS
HEART RATE: 106 BPM | BODY MASS INDEX: 30.38 KG/M2 | WEIGHT: 177.94 LBS | HEIGHT: 64 IN | SYSTOLIC BLOOD PRESSURE: 130 MMHG | DIASTOLIC BLOOD PRESSURE: 83 MMHG

## 2018-03-26 DIAGNOSIS — F41.9 ANXIETY: ICD-10-CM

## 2018-03-26 DIAGNOSIS — F90.0 ADHD, PREDOMINANTLY INATTENTIVE TYPE: Primary | ICD-10-CM

## 2018-03-26 DIAGNOSIS — F32.81 PMDD (PREMENSTRUAL DYSPHORIC DISORDER): ICD-10-CM

## 2018-03-26 DIAGNOSIS — F90.0 ATTENTION DEFICIT HYPERACTIVITY DISORDER (ADHD), PREDOMINANTLY INATTENTIVE TYPE: ICD-10-CM

## 2018-03-26 DIAGNOSIS — F32.A DEPRESSION, UNSPECIFIED DEPRESSION TYPE: ICD-10-CM

## 2018-03-26 PROCEDURE — 99999 PR PBB SHADOW E&M-EST. PATIENT-LVL III: CPT | Mod: PBBFAC,,, | Performed by: PSYCHIATRY & NEUROLOGY

## 2018-03-26 PROCEDURE — 90833 PSYTX W PT W E/M 30 MIN: CPT | Mod: S$GLB,,, | Performed by: PSYCHIATRY & NEUROLOGY

## 2018-03-26 PROCEDURE — 99213 OFFICE O/P EST LOW 20 MIN: CPT | Mod: S$GLB,,, | Performed by: PSYCHIATRY & NEUROLOGY

## 2018-03-26 RX ORDER — METHYLPHENIDATE HYDROCHLORIDE 10 MG/1
10 TABLET ORAL 2 TIMES DAILY
Qty: 60 TABLET | Refills: 0 | Status: SHIPPED | OUTPATIENT
Start: 2018-04-09 | End: 2018-05-30 | Stop reason: SDUPTHER

## 2018-03-26 RX ORDER — METHYLPHENIDATE HYDROCHLORIDE 10 MG/1
10 TABLET ORAL 2 TIMES DAILY
Qty: 60 TABLET | Refills: 0 | Status: SHIPPED | OUTPATIENT
Start: 2018-05-09 | End: 2018-05-30 | Stop reason: SDUPTHER

## 2018-03-26 RX ORDER — VENLAFAXINE HYDROCHLORIDE 75 MG/1
CAPSULE, EXTENDED RELEASE ORAL
Qty: 90 CAPSULE | Refills: 1 | Status: SHIPPED | OUTPATIENT
Start: 2018-03-26 | End: 2018-08-22 | Stop reason: SDUPTHER

## 2018-03-26 RX ORDER — METHYLPHENIDATE HYDROCHLORIDE 10 MG/1
10 TABLET ORAL 2 TIMES DAILY
Qty: 60 TABLET | Refills: 0 | Status: SHIPPED | OUTPATIENT
Start: 2018-06-08 | End: 2018-05-30 | Stop reason: SDUPTHER

## 2018-05-30 ENCOUNTER — OFFICE VISIT (OUTPATIENT)
Dept: PSYCHIATRY | Facility: CLINIC | Age: 33
End: 2018-05-30
Payer: COMMERCIAL

## 2018-05-30 VITALS
HEIGHT: 64 IN | SYSTOLIC BLOOD PRESSURE: 131 MMHG | HEART RATE: 95 BPM | DIASTOLIC BLOOD PRESSURE: 84 MMHG | BODY MASS INDEX: 30.93 KG/M2 | WEIGHT: 181.19 LBS

## 2018-05-30 DIAGNOSIS — F32.81 PMDD (PREMENSTRUAL DYSPHORIC DISORDER): ICD-10-CM

## 2018-05-30 DIAGNOSIS — F41.9 ANXIETY: ICD-10-CM

## 2018-05-30 DIAGNOSIS — F32.A DEPRESSION, UNSPECIFIED DEPRESSION TYPE: ICD-10-CM

## 2018-05-30 DIAGNOSIS — F90.0 ADHD, PREDOMINANTLY INATTENTIVE TYPE: ICD-10-CM

## 2018-05-30 DIAGNOSIS — F90.0 ATTENTION DEFICIT HYPERACTIVITY DISORDER (ADHD), PREDOMINANTLY INATTENTIVE TYPE: Primary | ICD-10-CM

## 2018-05-30 PROCEDURE — 99213 OFFICE O/P EST LOW 20 MIN: CPT | Mod: S$GLB,,, | Performed by: PSYCHIATRY & NEUROLOGY

## 2018-05-30 PROCEDURE — 90833 PSYTX W PT W E/M 30 MIN: CPT | Mod: S$GLB,,, | Performed by: PSYCHIATRY & NEUROLOGY

## 2018-05-30 PROCEDURE — 99999 PR PBB SHADOW E&M-EST. PATIENT-LVL III: CPT | Mod: PBBFAC,,, | Performed by: PSYCHIATRY & NEUROLOGY

## 2018-05-30 PROCEDURE — 3008F BODY MASS INDEX DOCD: CPT | Mod: CPTII,S$GLB,, | Performed by: PSYCHIATRY & NEUROLOGY

## 2018-05-30 RX ORDER — METHYLPHENIDATE HYDROCHLORIDE 10 MG/1
10 TABLET ORAL 2 TIMES DAILY
Qty: 60 TABLET | Refills: 0 | Status: SHIPPED | OUTPATIENT
Start: 2018-08-06 | End: 2018-08-22 | Stop reason: SDUPTHER

## 2018-05-30 RX ORDER — METHYLPHENIDATE HYDROCHLORIDE 10 MG/1
10 TABLET ORAL 2 TIMES DAILY
Qty: 60 TABLET | Refills: 0 | Status: SHIPPED | OUTPATIENT
Start: 2018-07-07 | End: 2018-08-22 | Stop reason: SDUPTHER

## 2018-05-30 NOTE — PROGRESS NOTES
Outpatient Psychiatry Follow-Up Visit (MD/NP)    5/30/2018    Clinical Status of Patient:  Outpatient (Ambulatory)     Session Length:  30 mins (E&M plus psychotherapy)     Chief Complaint:  Caridad Panda is a 33 y.o. female who presents today for follow-up of anxiety and adjustment problems.    Met with patient.      Interval History and Content of Current Session:  Interim Events/Subjective Report/Content of Current Session:  First f/u with me since appt on 3/26/2018 -- please see my note in pt's Epic medical record.      Pt states since her last appt with me, her 64 yo mom had a CVA; her doctors think it may have been uncontrolled a-fib.  She is recovering OK.      Also, her 87 yo maternal grandmother fell and hurt her knee and bruised her face (she is on blood thinners).  She also fell again later in the bedroom and fractured her hip.  She had to be acutely hospitalized.  She was scheduled for surgery (pt could not take off from work). However, she vomited and aspirated and had to go into the ICU 8 days ago.  She has been in the ICU since, unable to go through surgery.  She currently has pneumonia in both lungs and her WBC count skyrocketed to > 50K.  She may also have a-fib.  Yesterday, her chest x-ray looked better, so they are hoping she will continue to improve.  She has been sedated, on the ventilator.  Pt states that her grandmother has always been resistant to go see a doctor or go to the hospital for treatment, which means it has been a struggle for pt and her family members to get her into treatment.        Pt is still working at her job -- she states this part of her life is doing well.  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  She works 8 hours, 4 days a week, plus one Friday out of every 3 Fridays.    She states she has been doing well; she enjoys working with the dentist and his other staff/assistants.  She is also NOT  doing the insurance now, for which she is very happy.    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     With all that has happened to her mom and grandmother, she has NOT been studying.  She scored a 17 the last time she took the DONNA test, which is below the score she needed.    At her last appt, she stated she did apply to dental hygiene school (not sure if she was accepted).    She also is not exercising as she should either.     She states she has been taking the Ritalin just when she is studying.  It has helped well with concentration and studying.  She denies AE's.   She has been using the trazodone for sleep.  Occasionally may use Sonata to go back to sleep.    She continues to take the Effexor XR 75 mg capsule nightly.  If she forgets to take the dose the night before, she will have a bad migraine with nausea the next AM.    She also has PMDD -- gets more anxious, emotionally reactive for a few days prior to menses each month.  She takes Prozac 20 mg on those days and thinks it helps (averages about 5 - 7 days a month that she takes the Prozac).     She is still living in [place].     She sees Dr. Monahan (neurologist) at [facility] to help manage her migraine headaches.  She states she had her first migraine headache in 4th grade.    At times she has had severe migraines, has had to take ibuprofen, lie down and sleep to break it.    She snores, likely has JAVIER.  Her mom and brother also have JAVIER.  I have encouraged her to see a sleep specialist.      Pt has denied any personal Hx of heart disease.  Regarding family cardiac Hx: her mom has Hx of a-fib and her maternal gm had a recent TIA.    Her father  from an MI in his 40's and her paternal gf  from MI in his 50's.     Her brother has been evaluated by a cardiologist as a precaution because of family history (he currently has no signs of heart disease).      She had met with a EMIR (Estephanie Dunaway,  [facility]; #[phone]) for psychotherapy (not meeting with her recently due to cost).         PSYCHOTHERAPY ADD-ON [id number]   30 (16-37*) minutes    Site: [facility], [address]  Time: 20 minutes  Participants: Met with patient    Therapeutic Intervention Type: insight oriented psychotherapy, supportive psychotherapy  Why chosen therapy is appropriate versus another modality: relevant to diagnosis, patient responds to this modality    Target symptoms: depression, distractability, lack of focus, anxiety , adjustment, work stress  Primary focus: see above  Psychotherapeutic techniques: encouraged self-disclosure, active listening and feedback, reframing, encouragement of self care    Outcome monitoring methods: self-report, lab data, observation    Patient's response to intervention:  The patient's response to intervention is motivated.    Progress toward goals:  The patient's progress toward goals is fair .      Review of Systems   GENERAL:  Some recent weight gain   SKIN:  No rashes or lacerations  HEAD:  No headaches  EYES:  No exophthalmos, jaundice or blindness  EARS:  No dizziness, tinnitus or hearing loss  NOSE:  No changes in smell  MOUTH & THROAT:  No dyskinetic movements or obvious goiter  CHEST:  No shortness of breath, hyperventilation or cough  CARDIOVASCULAR:  No tachycardia or chest pain  ABDOMEN:  No nausea, vomiting, pain, constipation or diarrhea  URINARY:  No frequency, dysuria or sexual dysfunction  ENDOCRINE:  No polydipsia, polyuria  MUSCULOSKELETAL:  No pain or stiffness of the joints  NEUROLOGIC:  No weakness, sensory changes, seizures, confusion, memory loss, tremor or other abnormal movements.     Past Psychiatric History:  Took Prozac and Zoloft in past (Prozac as a teen with ADHD).  Also, tried Lexapro, but this med did not work.      Past Medical, Family and Social History: The patient's past medical, family and social history have been reviewed and updated as appropriate  "within the electronic medical record -- see encounter notes.    Current Psychotropic Medications:    Effexor XR 75 mg daily  Prozac 20 mg daily as needed (FOR PMDD; TAKES 1 OUT OF EVERY 4 WEEKS)   Vistaril 25 mg QID prn anxiety   Ritalin 10 mg IR BID (TAKES PRN FOR INATTENTION)    Compliance:  Yes.      Side effects: None    Risk Parameters:  Patient reports no suicidal ideation  Patient reports no homicidal ideation  Patient reports no self-injurious behavior  Patient reports no violent behavior    Exam (detailed: at least 9 elements; comprehensive: all 15 elements)   Constitutional  Vitals:  Most recent vital signs, dated less than 90 days prior to this appointment, were reviewed.     Vitals - 1 value per visit 1/15/2018 3/26/2018 5/30/2018   SYSTOLIC 116 130 150   DIASTOLIC 88 83 77   PULSE  106 105   Weight (lb) 182.32 177.91 181.22   Weight (kg) 82.7 80.7 82.2   HEIGHT 5' 4" 5' 4" 5' 4"   BODY MASS INDEX 31.3 30.54 31.11   VISIT REPORT      Pain Score  0         Vitals - 1 value per visit 6/26/2017 7/19/2017 10/18/2017   SYSTOLIC 118 139 137   DIASTOLIC 76 86 83   PULSE  105 84   Weight (lb) 178.57 180 180   Weight (kg) 81 81.647 81.647   HEIGHT 5' 4" 5' 4" 5' 4"   BODY MASS INDEX 30.65 30.9 30.9   VISIT REPORT      Pain Score  0         Vitals - 1 value per visit 12/8/2016 2/14/2017 5/4/2017   SYSTOLIC 133 132 130   DIASTOLIC 82 81 80   PULSE 109 105 94   Weight (lb) 173 172 178   Weight (kg) 78.472 78.019 80.74   HEIGHT 5' 4" 5' 4" 5' 4"   BODY MASS INDEX 29.7 29.52 30.55   VISIT REPORT      Pain Score            General:  unremarkable, age appropriate, well nourished, casually dressed, neatly groomed, pleasant, cooperative, good eye contact, engages well in conversation     Musculoskeletal  Muscle Strength/Tone:  not examined   Gait & Station:  non-ataxic     Psychiatric  Speech:  no latency; no press, spontaneous, good articulation   Mood & Affect:  mildly anxious  congruent and appropriate   Thought " "Process:  goal-directed, mildly circumstantial   Associations:  mildly circumstantial   Thought Content:  normal, no suicidality, no homicidality, delusions, or paranoia   Insight:  intact, has awareness of illness   Judgement: behavior is adequate to circumstances   Orientation:  grossly intact   Memory: intact for content of interview   Language: grossly intact   Attention Span & Concentration:  able to focus   Fund of Knowledge:  intact and appropriate to age and level of education     Assessment and Diagnosis   Status/Progress: Based on the examination today, the patient's problem(s) is/are adequately but not ideally controlled.  New problems have been presented today.   Co-morbidities are not complicating management of the primary condition.  There are no active rule-out diagnoses for this patient at this time.     General Impression:   ADHD, inattentive type  Anxiety Disorder, unspecified  PMDD  Depressive Disorder, unspecified (R/O Hx of dysthymic disorder or MDD)     Intervention/Counseling/Treatment Plan   Medication Management:  Continue Ritalin IR 10 mg TWICE DAILY as needed for concentration.  Risks and benefits of psychostimulants have been noted, including potential increased anxiety/agitation, tremor, initial insomnia, potential rapid cycling of mood in a bipolar patient, and possible tolerance and dependence to develop.  Also, strict LEIGH regulations for this medication have been noted, as well as recommendation to keep the medication secured, as no Rx would be written for patient if the Rx or supply of medication were lost or stolen.  Three Rx for Ritalin, for a 30 day supply with no refills & with "Do not fill until" date posted accordingly, were given to patient.   --Continue Prozac 20 mg one capsule daily PRN for PMDD.    --Continue other current medications as noted above.     Return to Clinic: 3 months, or sooner prn.    "

## 2018-06-18 ENCOUNTER — OFFICE VISIT (OUTPATIENT)
Dept: OBSTETRICS AND GYNECOLOGY | Facility: CLINIC | Age: 33
End: 2018-06-18
Payer: COMMERCIAL

## 2018-06-18 VITALS — HEIGHT: 63 IN | WEIGHT: 180.75 LBS | BODY MASS INDEX: 32.03 KG/M2

## 2018-06-18 DIAGNOSIS — Z01.419 VISIT FOR GYNECOLOGIC EXAMINATION: Primary | ICD-10-CM

## 2018-06-18 DIAGNOSIS — Z30.41 ENCOUNTER FOR SURVEILLANCE OF CONTRACEPTIVE PILLS: ICD-10-CM

## 2018-06-18 PROCEDURE — 99395 PREV VISIT EST AGE 18-39: CPT | Mod: S$GLB,,, | Performed by: OBSTETRICS & GYNECOLOGY

## 2018-06-18 PROCEDURE — 99999 PR PBB SHADOW E&M-EST. PATIENT-LVL II: CPT | Mod: PBBFAC,,, | Performed by: OBSTETRICS & GYNECOLOGY

## 2018-06-18 NOTE — PROGRESS NOTES
HISTORY OF PRESENT ILLNESS:    Caridad Panda is a 33 y.o. female, , Patient's last menstrual period was 2018.,  presents for a routine exam and has no complaints. PAP DUE  AND REFILL OCP.  NO GYN C/O.  MOTHER WITH STROKE RECENTLY AND GRANDMOTHER WITH FALL, BROKEN HIP AND PROLONGED HOSPITAL COURSE.  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 .     LAST VISIT POSTOP REMOVAL VULVAR SKIN TAG:  LEEP OF 2 VULVAR SKIN TAGE (PATH BENIGN).  There are no complaints, andthe procedure was uncomplicated.  LAST ROUTINE VISIT 2017:  PAP DUE .  REFILL OCP  WANTS TO SCHEDULE REMOVAL OF VULVAR SKIN TAG - HAS TWO, BUT THE .5 CM SKIN TAG AT THE CREASE BETWEEN VULVA AND LEFT THIGH IS NOT VERY SYMPTOMATIC.   ADVISED RE REMOVAL OF .5-1 CM FLESHY SKIN TAG ARISING FROM POSTERIOR PERINEUM SINCE IT CATCHES ON HER CLOTHES AND WOULD ADVISE LEEP SINCE IT IS ON A BROAD FLESHY BASE.  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  WILL REFILL OCP - HAS SOME CRAMPING AND FATIGUE PRIOR TO MENSES, ALSO OCCAS H/A.  ONLY BLEEDS 2 DAYS - OFFERED DIFFERENT OCP OR SEASONAL WITHDRAWAL AND DECLINES FOR NOW BUT WILL NOTIFY ME IF SHE CHANFGES HER MIND.    Past Medical History:   Diagnosis Date    Anxiety     Depression        Past Surgical History:   Procedure Laterality Date    HERNIA REPAIR      PATELA REPAIR         MEDICATIONS AND ALLERGIES:      Current Outpatient Prescriptions:     azelastine (ASTELIN) 137 mcg nasal spray, 1 spray by Nasal route 2 (two) times daily as needed for Rhinitis., Disp: , Rfl:     cetirizine (ZYRTEC) 10 MG tablet, Take 10 mg by mouth once daily., Disp: , Rfl:     FLUCELVAX QUAD 7479-4711, PF, 60 mcg (15 mcg x 4)/0.5 mL Syrg vaccine, , Disp: , Rfl:     fluoxetine (PROZAC) 20 MG capsule, Take 1 capsule (20 mg total) by mouth daily as needed (PMDD)., Disp: 30 capsule, Rfl: 2    fluticasone (FLONASE)  50 mcg/actuation nasal spray, 2 sprays by Each Nare route 2 (two) times daily., Disp: , Rfl: 3    hydrOXYzine pamoate (VISTARIL) 25 MG Cap, Take 1 capsule (25 mg total) by mouth 4 (four) times daily as needed (anxiety)., Disp: 60 capsule, Rfl: 3    [START ON 7/7/2018] methylphenidate HCl (RITALIN) 10 MG tablet, Take 1 tablet (10 mg total) by mouth 2 (two) times daily., Disp: 60 tablet, Rfl: 0    [START ON 8/6/2018] methylphenidate HCl (RITALIN) 10 MG tablet, Take 1 tablet (10 mg total) by mouth 2 (two) times daily., Disp: 60 tablet, Rfl: 0    norethindrone-ethinyl estradiol (CYCLAFEM 1/35, 28,) 1-35 mg-mcg per tablet, Take 1 tablet by mouth once daily., Disp: 84 tablet, Rfl: 3    omeprazole (PRILOSEC) 40 MG capsule, Take 1 capsule by mouth once daily., Disp: , Rfl: 2    TROKENDI XR 25 mg Cp24, , Disp: , Rfl:     venlafaxine (EFFEXOR-XR) 75 MG 24 hr capsule, TAKE 1 CAPSULE (75 MG TOTAL) BY MOUTH ONCE DAILY., Disp: 90 capsule, Rfl: 1    zaleplon (SONATA) 10 MG capsule, Take 1 capsule (10 mg total) by mouth every evening., Disp: 30 capsule, Rfl: 3    ZOMIG 5 mg Spry, , Disp: , Rfl:     Review of patient's allergies indicates:   Allergen Reactions    Iodine and iodide containing products Itching and Swelling       Family History   Problem Relation Age of Onset    Heart attacks under age 50 Father     Hypertension Father     Cervical cancer Maternal Grandmother     Hypertension Maternal Grandmother     Cancer Maternal Grandmother     Stroke Maternal Grandmother     Colon cancer Maternal Uncle     Cancer Maternal Uncle     Atrial fibrillation Mother     Hypertension Mother     Hypertension Brother     Cancer Maternal Aunt     Diabetes Maternal Grandfather     Cancer Maternal Grandfather     Breast cancer Neg Hx        Social History     Social History    Marital status: Single     Spouse name: N/A    Number of children: 0    Years of education: N/A     Occupational History    Not on file.  "    Social History Main Topics    Smoking status: Never Smoker    Smokeless tobacco: Never Used    Alcohol use Yes      Comment: SOCIALLY, 1.5 pitchers of beer every 2 weeks    Drug use: No    Sexual activity: Yes     Partners: Male     Birth control/ protection: OCP     Other Topics Concern    Not on file     Social History Narrative    No narrative on file       COMPREHENSIVE GYN HISTORY:  PAP History: Denies abnormal Paps.  Infection History: Denies STDs. Denies PID.  Benign History: Denies uterine fibroids. Denies ovarian cysts. Denies endometriosis. Denies other conditions.  Cancer History: Denies cervical cancer. Denies uterine cancer or hyperplasia. Denies ovarian cancer. Denies vulvar cancer or pre-cancer. Denies vaginal cancer or pre-cancer. Denies breast cancer. Denies colon cancer.  Sexual Activity History: Reports currently being sexually active  Menstrual History: Monthly, mild-moderate.  Contraception:oral contraceptives (estrogen/progesterone)    ROS:  GENERAL: No weight changes. No swelling. No fatigue. No fever.  CARDIOVASCULAR: No chest pain. No shortness of breath. No leg cramps.   NEUROLOGICAL: No headaches. No vision changes.  BREASTS: No pain. No lumps. No discharge.  ABDOMEN: No pain. No nausea. No vomiting. No diarrhea. No constipation.  REPRODUCTIVE: No abnormal bleeding.   VULVA: No pain. No lesions. No itching.  VAGINA: No relaxation. No itching. No odor. No discharge. No lesions.  URINARY: No incontinence. No nocturia. No frequency. No dysuria.    Ht 5' 3" (1.6 m)   Wt 82 kg (180 lb 12.4 oz)   LMP 06/11/2018   BMI 32.02 kg/m²     PE:  APPEARANCE: Well nourished, well developed, in no acute distress.  AFFECT: WNL, alert and oriented x 3.  SKIN: No acne or hirsutism.  NECK: Neck symmetric, without masses or thyromegaly.  NODES: No inguinal, cervical, axillary or femoral lymph node enlargement.  CHEST: Good respiratory effort.   ABDOMEN: Soft. No tenderness or masses. No " hepatosplenomegaly. No hernias.  BREASTS: Symmetrical, no skin changes, visible lesions, palpable masses or nipple discharge bilaterally.  PELVIC: External female genitalia without lesions.  Female hair distribution. Adequate perineal body, Normal urethral meatus. Vagina moist and well rugated without lesions or discharge.  No significant cystocele or rectocele present. Cervix pink without lesions, discharge or tenderness. Uterus is normal size, regular, mobile and nontender. Adnexa without masses or tenderness.  EXTREMITIES: No edema    PROCEDURES:      DIAGNOSIS:  1. Visit for gynecologic examination     2. Encounter for surveillance of contraceptive pills         LABS AND TESTS ORDERED:    MEDICATIONS PRESCRIBED:    COUNSELING:   The patient was counseled today on ACS PAP guidelines, with recommendations for yearly pelvic exams unless their uterus, cervix, and ovaries were removed for benign reasons; in that case, examinations every 3-5 years are recommended.  The patient was also counseled regarding monthly breast self-examination, routine STD screening for at-risk populations, prophylactic immunizations for transmitted infections such as  HPV, Pertussis, or Influenza as appropriate, and yearly mammograms when indicated by ACS guidelines.  She was advised to see her primary care physician for all other health maintenance.    FOLLOW-UP with me annually.

## 2018-08-22 ENCOUNTER — OFFICE VISIT (OUTPATIENT)
Dept: PSYCHIATRY | Facility: CLINIC | Age: 33
End: 2018-08-22
Payer: COMMERCIAL

## 2018-08-22 VITALS
HEART RATE: 88 BPM | WEIGHT: 181.31 LBS | SYSTOLIC BLOOD PRESSURE: 133 MMHG | DIASTOLIC BLOOD PRESSURE: 74 MMHG | HEIGHT: 63 IN | BODY MASS INDEX: 32.12 KG/M2

## 2018-08-22 DIAGNOSIS — F41.9 ANXIETY: ICD-10-CM

## 2018-08-22 DIAGNOSIS — F32.A DEPRESSION, UNSPECIFIED DEPRESSION TYPE: ICD-10-CM

## 2018-08-22 DIAGNOSIS — F32.81 PMDD (PREMENSTRUAL DYSPHORIC DISORDER): ICD-10-CM

## 2018-08-22 DIAGNOSIS — F90.0 ADHD, PREDOMINANTLY INATTENTIVE TYPE: ICD-10-CM

## 2018-08-22 DIAGNOSIS — F90.0 ATTENTION DEFICIT HYPERACTIVITY DISORDER (ADHD), PREDOMINANTLY INATTENTIVE TYPE: Primary | ICD-10-CM

## 2018-08-22 DIAGNOSIS — F41.9 ANXIETY DISORDER, UNSPECIFIED TYPE: ICD-10-CM

## 2018-08-22 PROCEDURE — 90833 PSYTX W PT W E/M 30 MIN: CPT | Mod: S$GLB,,, | Performed by: PSYCHIATRY & NEUROLOGY

## 2018-08-22 PROCEDURE — 99999 PR PBB SHADOW E&M-EST. PATIENT-LVL III: CPT | Mod: PBBFAC,,, | Performed by: PSYCHIATRY & NEUROLOGY

## 2018-08-22 PROCEDURE — 99213 OFFICE O/P EST LOW 20 MIN: CPT | Mod: S$GLB,,, | Performed by: PSYCHIATRY & NEUROLOGY

## 2018-08-22 PROCEDURE — 3008F BODY MASS INDEX DOCD: CPT | Mod: CPTII,S$GLB,, | Performed by: PSYCHIATRY & NEUROLOGY

## 2018-08-22 RX ORDER — METHYLPHENIDATE HYDROCHLORIDE 10 MG/1
10 TABLET ORAL 2 TIMES DAILY
Qty: 60 TABLET | Refills: 0 | Status: SHIPPED | OUTPATIENT
Start: 2018-08-22 | End: 2018-10-31 | Stop reason: SDUPTHER

## 2018-08-22 RX ORDER — HYDROXYZINE PAMOATE 25 MG/1
25 CAPSULE ORAL 4 TIMES DAILY PRN
Qty: 60 CAPSULE | Refills: 3 | Status: SHIPPED | OUTPATIENT
Start: 2018-08-22 | End: 2019-04-02 | Stop reason: SDUPTHER

## 2018-08-22 RX ORDER — METHYLPHENIDATE HYDROCHLORIDE 10 MG/1
10 TABLET ORAL 2 TIMES DAILY
Qty: 60 TABLET | Refills: 0 | Status: SHIPPED | OUTPATIENT
Start: 2018-10-20 | End: 2018-10-31 | Stop reason: SDUPTHER

## 2018-08-22 RX ORDER — METHYLPHENIDATE HYDROCHLORIDE 10 MG/1
10 TABLET ORAL 2 TIMES DAILY
Qty: 60 TABLET | Refills: 0 | Status: SHIPPED | OUTPATIENT
Start: 2018-09-21 | End: 2018-10-31 | Stop reason: SDUPTHER

## 2018-08-22 RX ORDER — VENLAFAXINE HYDROCHLORIDE 75 MG/1
CAPSULE, EXTENDED RELEASE ORAL
Qty: 90 CAPSULE | Refills: 1 | Status: SHIPPED | OUTPATIENT
Start: 2018-08-22 | End: 2018-10-31 | Stop reason: SDUPTHER

## 2018-08-22 NOTE — PROGRESS NOTES
Outpatient Psychiatry Follow-Up Visit (MD/NP)    8/22/2018    Clinical Status of Patient:  Outpatient (Ambulatory)     Session Length:  30 mins (E&M plus psychotherapy)     Chief Complaint:  Caridad Panda is a 33 y.o. female who presents today for follow-up of anxiety and adjustment problems.    Met with patient.      Interval History and Content of Current Session:  Interim Events/Subjective Report/Content of Current Session:  First f/u with me since appt on 5/30/2018 -- please see my note in pt's Epic medical record.      Pt states her grandmother (maternal) is now in a NH in [place].  Pt is concerned about a sacrum decubitus that came about when she was at [facility] prior to transfer to [facility].  She has pain from this.  She has stabilized well after having sustained a fall and having pneumonia that required intubation/ventilator.       Her mom is doing OK; she had a CVA previously, but had recovered well.  Biggest problem is that she has not been compliant taking all of her meds.  She will f/u with her doctors.      Pt is still working at her job -- she states this part of her life is doing well.  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  She works 8 hours, 4 days a week, plus one Friday out of every 3 Fridays.    She states she has been doing well; she enjoys working with the dentist and his other staff/assistants.  She is also NOT doing the insurance now, for which she is very happy.     She is taking a DONNA prep course and plans to sit for the DONNA later.    However, she is looking at applying to dental school at other places besides [employer] -- there are 3 schools in [place] ([place], [place], [place]), as well as one in [place] and one in [facility].    [employer] is the only dental school program in [place].      She states she has been taking the Ritalin just when she is studying.  It has helped well with concentration and studying.  She denies AE's.   She has  been using the trazodone for sleep.  Occasionally may use Sonata to go back to sleep.    She continues to take the Effexor XR 75 mg capsule nightly.  If she forgets to take the dose the night before, she will have a bad migraine with nausea the next AM.    She also has PMDD -- gets more anxious, emotionally reactive for a few days prior to menses each month.  She takes Prozac 20 mg on those days and thinks it helps (averages about 5 - 7 days a month that she takes the Prozac).     She is still living in [place].     She sees Dr. Monahan (neurologist) at [facility] to help manage her migraine headaches.  She states she had her first migraine headache in 4th grade.    At times she has had severe migraines, has had to take ibuprofen, lie down and sleep to break it.    She snores, likely has JAVIER.  Her mom and brother also have JAVIER.  I have encouraged her to see a sleep specialist.      Pt has denied any personal Hx of heart disease.  Regarding family cardiac Hx: her mom has Hx of a-fib and her maternal gm had a recent TIA.    Her father  from an MI in his 40's and her paternal gf  from MI in his 50's.     Her brother has been evaluated by a cardiologist as a precaution because of family history (he currently has no signs of heart disease).      She had met with a SW (Estephanie Dunaway [facility]; #[phone]) for psychotherapy (not meeting with her recently due to cost).         PSYCHOTHERAPY ADD-ON [phone]   30 (16-37*) minutes    Site: [facility], [address]  Time: 20 minutes  Participants: Met with patient    Therapeutic Intervention Type: insight oriented psychotherapy, supportive psychotherapy  Why chosen therapy is appropriate versus another modality: relevant to diagnosis, patient responds to this modality    Target symptoms: depression, distractability, lack of focus, anxiety , adjustment, work stress  Primary focus: see above  Psychotherapeutic techniques: encouraged self-disclosure, active listening  and feedback, reframing, encouragement of self care    Outcome monitoring methods: self-report, lab data, observation    Patient's response to intervention:  The patient's response to intervention is motivated.    Progress toward goals:  The patient's progress toward goals is fair .      Review of Systems   GENERAL:  No significant wt gain or loss   SKIN:  No rashes or lacerations  HEAD:  No headaches  EYES:  No exophthalmos, jaundice or blindness  EARS:  No dizziness, tinnitus or hearing loss  NOSE:  No changes in smell  MOUTH & THROAT:  No dyskinetic movements or obvious goiter  CHEST:  No shortness of breath, hyperventilation or cough  CARDIOVASCULAR:  No tachycardia or chest pain  ABDOMEN:  No nausea, vomiting, pain, constipation or diarrhea  URINARY:  No frequency, dysuria or sexual dysfunction  ENDOCRINE:  No polydipsia, polyuria  MUSCULOSKELETAL:  No pain or stiffness of the joints  NEUROLOGIC:  No weakness, sensory changes, seizures, confusion, memory loss, tremor or other abnormal movements.     Past Psychiatric History:  Took Prozac and Zoloft in past (Prozac as a teen with ADHD).  Also, tried Lexapro, but this med did not work.      Past Medical, Family and Social History: The patient's past medical, family and social history have been reviewed and updated as appropriate within the electronic medical record -- see encounter notes.    Current Psychotropic Medications:    Effexor XR 75 mg daily  Prozac 20 mg daily as needed (FOR PMDD; TAKES 1 OUT OF EVERY 4 WEEKS)   Vistaril 25 mg QID prn anxiety   Ritalin 10 mg IR BID (TAKES PRN FOR INATTENTION)    Compliance:  Yes.      Side effects: None    Risk Parameters:  Patient reports no suicidal ideation  Patient reports no homicidal ideation  Patient reports no self-injurious behavior  Patient reports no violent behavior    Exam (detailed: at least 9 elements; comprehensive: all 15 elements)   Constitutional  Vitals:  Most recent vital signs, dated less than 90  "days prior to this appointment, were reviewed.     Vitals - 1 value per visit 3/26/2018 5/30/2018 6/18/2018 8/22/2018   SYSTOLIC 130 131  133   DIASTOLIC 83 84  74   PULSE 106 95  88   Weight (lb) 177.91 181.22 180.78 181.33   Weight (kg) 80.7 82.2 82 82.25   HEIGHT 5' 4" 5' 4" 5' 3" 5' 3"   BODY MASS INDEX 30.54 31.11 32.02 32.12   VISIT REPORT       Pain Score    0        Vitals - 1 value per visit 11/15/2017 1/10/2018 1/15/2018   SYSTOLIC 130 135 116   DIASTOLIC 80 78 88   PULSE  99    Weight (lb) 181 183 182.32   Weight (kg) 82.1 83.008 82.7   HEIGHT 5' 4" 5' 4" 5' 4"   BODY MASS INDEX 31.07 31.41 31.3   VISIT REPORT      Pain Score  0  0       Vitals - 1 value per visit 6/26/2017 7/19/2017 10/18/2017   SYSTOLIC 118 139 137   DIASTOLIC 76 86 83   PULSE  105 84   Weight (lb) 178.57 180 180   Weight (kg) 81 81.647 81.647   HEIGHT 5' 4" 5' 4" 5' 4"   BODY MASS INDEX 30.65 30.9 30.9   VISIT REPORT      Pain Score  0         Vitals - 1 value per visit 12/8/2016 2/14/2017 5/4/2017   SYSTOLIC 133 132 130   DIASTOLIC 82 81 80   PULSE 109 105 94   Weight (lb) 173 172 178   Weight (kg) 78.472 78.019 80.74   HEIGHT 5' 4" 5' 4" 5' 4"   BODY MASS INDEX 29.7 29.52 30.55   VISIT REPORT      Pain Score            General:  unremarkable, age appropriate, well nourished, casually dressed, neatly groomed, pleasant, cooperative, good eye contact, engages well in conversation     Musculoskeletal  Muscle Strength/Tone:  not examined   Gait & Station:  non-ataxic     Psychiatric  Speech:  no latency; no press, spontaneous, good articulation   Mood & Affect:  mildly anxious  congruent and appropriate   Thought Process:  goal-directed, mildly circumstantial   Associations:  mildly circumstantial   Thought Content:  normal, no suicidality, no homicidality, delusions, or paranoia   Insight:  intact, has awareness of illness   Judgement: behavior is adequate to circumstances   Orientation:  grossly intact   Memory: intact for content of " "interview   Language: grossly intact   Attention Span & Concentration:  able to focus   Fund of Knowledge:  intact and appropriate to age and level of education     Assessment and Diagnosis   Status/Progress: Based on the examination today, the patient's problem(s) is/are adequately but not ideally controlled.  New problems have been presented today.   Co-morbidities are not complicating management of the primary condition.  There are no active rule-out diagnoses for this patient at this time.     General Impression:   ADHD, inattentive type  Anxiety Disorder, unspecified  PMDD  Depressive Disorder, unspecified (R/O Hx of dysthymic disorder or MDD)     Intervention/Counseling/Treatment Plan   Medication Management:  Continue Ritalin IR 10 mg TWICE DAILY as needed for concentration.  Risks and benefits of psychostimulants have been noted, including potential increased anxiety/agitation, tremor, initial insomnia, potential rapid cycling of mood in a bipolar patient, and possible tolerance and dependence to develop.  Also, strict LEIGH regulations for this medication have been noted, as well as recommendation to keep the medication secured, as no Rx would be written for patient if the Rx or supply of medication were lost or stolen.  Three Rx for Ritalin, for a 30 day supply with no refills & with "Do not fill until" date posted accordingly, were given to patient.   --Continue Prozac 20 mg one capsule daily PRN for PMDD.    --Continue other current medications as noted above.     Return to Clinic: 3 months, or sooner prn.    "

## 2018-10-31 ENCOUNTER — OFFICE VISIT (OUTPATIENT)
Dept: PSYCHIATRY | Facility: CLINIC | Age: 33
End: 2018-10-31
Payer: COMMERCIAL

## 2018-10-31 VITALS
HEIGHT: 63 IN | DIASTOLIC BLOOD PRESSURE: 85 MMHG | BODY MASS INDEX: 32.35 KG/M2 | WEIGHT: 182.56 LBS | SYSTOLIC BLOOD PRESSURE: 135 MMHG | HEART RATE: 94 BPM

## 2018-10-31 DIAGNOSIS — F32.A DEPRESSION, UNSPECIFIED DEPRESSION TYPE: ICD-10-CM

## 2018-10-31 DIAGNOSIS — F90.0 ADHD, PREDOMINANTLY INATTENTIVE TYPE: Primary | ICD-10-CM

## 2018-10-31 DIAGNOSIS — F32.81 PMDD (PREMENSTRUAL DYSPHORIC DISORDER): ICD-10-CM

## 2018-10-31 DIAGNOSIS — F41.9 ANXIETY: ICD-10-CM

## 2018-10-31 DIAGNOSIS — F90.0 ATTENTION DEFICIT HYPERACTIVITY DISORDER (ADHD), PREDOMINANTLY INATTENTIVE TYPE: ICD-10-CM

## 2018-10-31 PROCEDURE — 99213 OFFICE O/P EST LOW 20 MIN: CPT | Mod: S$GLB,,, | Performed by: PSYCHIATRY & NEUROLOGY

## 2018-10-31 PROCEDURE — 99999 PR PBB SHADOW E&M-EST. PATIENT-LVL II: CPT | Mod: PBBFAC,,, | Performed by: PSYCHIATRY & NEUROLOGY

## 2018-10-31 PROCEDURE — 3008F BODY MASS INDEX DOCD: CPT | Mod: CPTII,S$GLB,, | Performed by: PSYCHIATRY & NEUROLOGY

## 2018-10-31 RX ORDER — VENLAFAXINE HYDROCHLORIDE 75 MG/1
CAPSULE, EXTENDED RELEASE ORAL
Qty: 90 CAPSULE | Refills: 1 | Status: SHIPPED | OUTPATIENT
Start: 2018-10-31 | End: 2019-02-06 | Stop reason: SDUPTHER

## 2018-10-31 RX ORDER — METHYLPHENIDATE HYDROCHLORIDE 10 MG/1
10 TABLET ORAL 2 TIMES DAILY
Qty: 60 TABLET | Refills: 0 | Status: SHIPPED | OUTPATIENT
Start: 2018-12-29 | End: 2019-02-06 | Stop reason: SDUPTHER

## 2018-10-31 RX ORDER — METHYLPHENIDATE HYDROCHLORIDE 10 MG/1
10 TABLET ORAL 2 TIMES DAILY
Qty: 60 TABLET | Refills: 0 | Status: SHIPPED | OUTPATIENT
Start: 2018-11-30 | End: 2019-02-06 | Stop reason: SDUPTHER

## 2018-10-31 RX ORDER — METHYLPHENIDATE HYDROCHLORIDE 10 MG/1
10 TABLET ORAL 2 TIMES DAILY
Qty: 60 TABLET | Refills: 0 | Status: SHIPPED | OUTPATIENT
Start: 2018-10-31 | End: 2019-02-06 | Stop reason: SDUPTHER

## 2018-10-31 NOTE — PROGRESS NOTES
STAFF NOTE:  Pt was 25' late for a 30' appt.    She was seen for only about 10' for med check.      She states she has been doing well; denied any complaints currently.    She has been compliant with psych meds as listed below.  She denied any AE's to these meds.        Current Outpatient Medications:     azelastine (ASTELIN) 137 mcg nasal spray, 1 spray by Nasal route 2 (two) times daily as needed for Rhinitis., Disp: , Rfl:     cetirizine (ZYRTEC) 10 MG tablet, Take 10 mg by mouth once daily., Disp: , Rfl:     FLUCELVAX QUAD [id number], PF, 60 mcg (15 mcg x 4)/0.5 mL Syrg vaccine, , Disp: , Rfl:     fluoxetine (PROZAC) 20 MG capsule, Take 1 capsule (20 mg total) by mouth daily as needed (PMDD)., Disp: 30 capsule, Rfl: 2    fluticasone (FLONASE) 50 mcg/actuation nasal spray, 2 sprays by Each Nare route 2 (two) times daily., Disp: , Rfl: 3    hydrOXYzine pamoate (VISTARIL) 25 MG Cap, Take 1 capsule (25 mg total) by mouth 4 (four) times daily as needed (anxiety)., Disp: 60 capsule, Rfl: 3    methylphenidate HCl (RITALIN) 10 MG tablet, Take 1 tablet (10 mg total) by mouth 2 (two) times daily., Disp: 60 tablet, Rfl: 0    [START ON 11/30/2018] methylphenidate HCl (RITALIN) 10 MG tablet, Take 1 tablet (10 mg total) by mouth 2 (two) times daily., Disp: 60 tablet, Rfl: 0    [START ON 12/29/2018] methylphenidate HCl (RITALIN) 10 MG tablet, Take 1 tablet (10 mg total) by mouth 2 (two) times daily., Disp: 60 tablet, Rfl: 0    norethindrone-ethinyl estradiol (CYCLAFEM 1/35, 28,) 1-35 mg-mcg per tablet, Take 1 tablet by mouth once daily., Disp: 84 tablet, Rfl: 3    omeprazole (PRILOSEC) 40 MG capsule, Take 1 capsule by mouth once daily., Disp: , Rfl: 2    TROKENDI XR 25 mg Cp24, , Disp: , Rfl:     venlafaxine (EFFEXOR-XR) 75 MG 24 hr capsule, TAKE 1 CAPSULE (75 MG TOTAL) BY MOUTH ONCE DAILY., Disp: 90 capsule, Rfl: 1    zaleplon (SONATA) 10 MG capsule, Take 1 capsule (10 mg total) by mouth every evening., Disp: 30  "capsule, Rfl: 3    ZOMIG 5 mg Spry, , Disp: , Rfl:      Compliance:  Yes.       Side effects: None     Risk Parameters:  Patient reports no suicidal ideation  Patient reports no homicidal ideation  Patient reports no self-injurious behavior  Patient reports no violent behavior    Vitals:    10/31/18 0843   BP: 135/85   Pulse: 94   Weight: 82.8 kg (182 lb 8.7 oz)   Height: 5' 3" (1.6 m)     General:  unremarkable, age appropriate, well nourished, casually dressed, neatly groomed, pleasant, cooperative, good eye contact, engages well in conversation      Musculoskeletal  Muscle Strength/Tone:  not examined   Gait & Station:  non-ataxic      Psychiatric  Speech:  no latency; no press, spontaneous, good articulation   Mood & Affect:  mildly anxious  congruent and appropriate   Thought Process:  goal-directed, mildly circumstantial   Associations:  mildly circumstantial   Thought Content:  normal, no suicidality, no homicidality, delusions, or paranoia   Insight:  intact, has awareness of illness   Judgement: behavior is adequate to circumstances   Orientation:  grossly intact   Memory: intact for content of interview   Language: grossly intact   Attention Span & Concentration:  able to focus   Fund of Knowledge:  intact and appropriate to age and level of education     General Impression:   ADHD, inattentive type  Anxiety Disorder, unspecified  PMDD  Depressive Disorder, unspecified (R/O Hx of dysthymic disorder or MDD)      Plan:  Continue Ritalin IR 10 mg TWICE DAILY as needed for concentration.  Risks and benefits of psychostimulants have been noted, including potential increased anxiety/agitation, tremor, initial insomnia, potential rapid cycling of mood in a bipolar patient, and possible tolerance and dependence to develop.  Also, strict LEIGH regulations for this medication have been noted, as well as recommendation to keep the medication secured, as no Rx would be written for patient if the Rx or supply of " "medication were lost or stolen.  Three Rx for Ritalin, for a 30 day supply with no refills & with "Do not fill until" date posted accordingly, were given to patient.   --Continue Prozac 20 mg one capsule daily PRN for PMDD.    --Continue other current medications as noted above.      Return to clinic in 3 months for f/u appt.        Christofer Zaragoza MD      "

## 2019-02-06 ENCOUNTER — OFFICE VISIT (OUTPATIENT)
Dept: PSYCHIATRY | Facility: CLINIC | Age: 34
End: 2019-02-06
Payer: COMMERCIAL

## 2019-02-06 VITALS
WEIGHT: 181.19 LBS | DIASTOLIC BLOOD PRESSURE: 62 MMHG | SYSTOLIC BLOOD PRESSURE: 126 MMHG | BODY MASS INDEX: 32.11 KG/M2 | HEIGHT: 63 IN | HEART RATE: 91 BPM

## 2019-02-06 DIAGNOSIS — F90.0 ADHD, PREDOMINANTLY INATTENTIVE TYPE: ICD-10-CM

## 2019-02-06 DIAGNOSIS — F90.0 ATTENTION DEFICIT HYPERACTIVITY DISORDER (ADHD), PREDOMINANTLY INATTENTIVE TYPE: Primary | ICD-10-CM

## 2019-02-06 DIAGNOSIS — F41.9 ANXIETY: ICD-10-CM

## 2019-02-06 DIAGNOSIS — F32.A DEPRESSION, UNSPECIFIED DEPRESSION TYPE: ICD-10-CM

## 2019-02-06 DIAGNOSIS — F32.81 PMDD (PREMENSTRUAL DYSPHORIC DISORDER): ICD-10-CM

## 2019-02-06 PROCEDURE — 99213 PR OFFICE/OUTPT VISIT, EST, LEVL III, 20-29 MIN: ICD-10-PCS | Mod: S$GLB,,, | Performed by: PSYCHIATRY & NEUROLOGY

## 2019-02-06 PROCEDURE — 3008F PR BODY MASS INDEX (BMI) DOCUMENTED: ICD-10-PCS | Mod: CPTII,S$GLB,, | Performed by: PSYCHIATRY & NEUROLOGY

## 2019-02-06 PROCEDURE — 90833 PSYTX W PT W E/M 30 MIN: CPT | Mod: S$GLB,,, | Performed by: PSYCHIATRY & NEUROLOGY

## 2019-02-06 PROCEDURE — 99213 OFFICE O/P EST LOW 20 MIN: CPT | Mod: S$GLB,,, | Performed by: PSYCHIATRY & NEUROLOGY

## 2019-02-06 PROCEDURE — 99999 PR PBB SHADOW E&M-EST. PATIENT-LVL III: ICD-10-PCS | Mod: PBBFAC,,, | Performed by: PSYCHIATRY & NEUROLOGY

## 2019-02-06 PROCEDURE — 99999 PR PBB SHADOW E&M-EST. PATIENT-LVL III: CPT | Mod: PBBFAC,,, | Performed by: PSYCHIATRY & NEUROLOGY

## 2019-02-06 PROCEDURE — 3008F BODY MASS INDEX DOCD: CPT | Mod: CPTII,S$GLB,, | Performed by: PSYCHIATRY & NEUROLOGY

## 2019-02-06 PROCEDURE — 90833 PR PSYCHOTHERAPY W/PATIENT W/E&M, 30 MIN (ADD ON): ICD-10-PCS | Mod: S$GLB,,, | Performed by: PSYCHIATRY & NEUROLOGY

## 2019-02-06 RX ORDER — METHYLPHENIDATE HYDROCHLORIDE 10 MG/1
10 TABLET ORAL 2 TIMES DAILY
Qty: 60 TABLET | Refills: 0 | Status: SHIPPED | OUTPATIENT
Start: 2019-04-07 | End: 2019-04-02 | Stop reason: SDUPTHER

## 2019-02-06 RX ORDER — VENLAFAXINE HYDROCHLORIDE 75 MG/1
CAPSULE, EXTENDED RELEASE ORAL
Qty: 90 CAPSULE | Refills: 1 | Status: SHIPPED | OUTPATIENT
Start: 2019-02-06 | End: 2019-04-02 | Stop reason: SDUPTHER

## 2019-02-06 RX ORDER — METHYLPHENIDATE HYDROCHLORIDE 10 MG/1
10 TABLET ORAL 2 TIMES DAILY
Qty: 60 TABLET | Refills: 0 | Status: SHIPPED | OUTPATIENT
Start: 2019-03-08 | End: 2019-07-30 | Stop reason: SDUPTHER

## 2019-02-06 RX ORDER — METHYLPHENIDATE HYDROCHLORIDE 10 MG/1
10 TABLET ORAL 2 TIMES DAILY
Qty: 60 TABLET | Refills: 0 | Status: SHIPPED | OUTPATIENT
Start: 2019-02-06 | End: 2019-04-02 | Stop reason: SDUPTHER

## 2019-02-06 NOTE — PROGRESS NOTES
"Outpatient Psychiatry Follow-Up Visit (MD/NP)    2/6/2019    Clinical Status of Patient:  Outpatient (Ambulatory)     Session Length:  30 mins (E&M plus psychotherapy)     Chief Complaint:  Caridad Panda is a 33 y.o. female who presents today for follow-up of anxiety and adjustment problems.    Met with patient.      Interval History and Content of Current Session:  Interim Events/Subjective Report/Content of Current Session:  First f/u with me since appt on 10/31/2018 -- please see my note in pt's Epic medical record.      She states she was let go from her last job 2 weeks before Christmas 2018.    She states she did a bleaching and the patient developed ulcers in her mouth.  She states she was told this by the , NOT the dentist.  She states this is the first time she has EVER had a patient who had a complication from bleaching -- she states she never heard directly from the dentist about this.  She stated they basically then let her go.    However, she was able to get another job.  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  She has been there for a month -- first month in a 3 month trial.    She states the pace is slower -- she feels awkward when there are no patients due to cancellations.  She tries to keep herself busy with things that need tending around the office.    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    The dentist also refuses to "double book" cases in case this occurs.      She states her, her mom, her brother and maternal grandfather were together in [place] for Christmas (they flew to [place]).      She states it was stressful.  Her mom and grandfather tend to argue to settle disputes, which pt hates.  She will disengage from an argument, then gets accused of being aloof, or "pouting like a 6 yo child".     Also, it really bothers pt that her mom often refuses to take her medication " for no apparent reason.  She and her brother both have confronted her mom on numerous occasions when this has happened.    Her mom had a CVA in the past.  She needs to take meds to help prevent having another CVA.  She also has OCD and depression -- she is supposed to take an SSRI from the mental health clinic where she goes.     She is taking a DONNA prep course.  She still plans to sit for the DONNA later.    She has been looking at applying to dental school at other places besides [employer] [facility] -- there are 3 schools in [place] ([place], [place], [place]), as well as one in [place] and one in [facility].    [employer] [facility] is the only dental school program in [place].      She states she has been taking the Ritalin just when she is studying.  It has helped well with concentration and studying.  She denies AE's.   She has NOT needed medication lately for sleep.    She continues to take the Effexor XR 75 mg capsule nightly.  If she forgets to take the dose the night before, she will have a bad migraine with nausea the next AM.    She also has PMDD -- gets more anxious, emotionally reactive for a few days prior to menses each month.  She takes Prozac 20 mg on those days and thinks it helps (averages about 5 - 7 days a month that she takes the Prozac).     She is still living in [place].     She sees Dr. Monahan (neurologist) at [facility] to help manage her migraine headaches.  She states she had her first migraine headache in 4th grade.    At times she has had severe migraines, has had to take ibuprofen, lie down and sleep to break it.    She snores, likely has JAVIER.  Her mom and brother also have JAVIER.  I have encouraged her to see a sleep specialist.      Pt has denied any personal Hx of heart disease.  Regarding family cardiac Hx: her mom has Hx of a-fib and her maternal gm had a recent TIA.    Her father  from an MI in his 40's and her paternal gf  from MI in his 50's.     Her brother has been evaluated by a cardiologist as a precaution  because of family history (he currently has no signs of heart disease).      She had met with a SW (Estephanie Dunaway, [facility]; #[phone]) for psychotherapy (not meeting with her recently due to cost).         PSYCHOTHERAPY ADD-ON [phone]   30 (16-37*) minutes    Site: [facility], [address]  Time: 20 minutes  Participants: Met with patient    Therapeutic Intervention Type: insight oriented psychotherapy, supportive psychotherapy  Why chosen therapy is appropriate versus another modality: relevant to diagnosis, patient responds to this modality    Target symptoms: depression, distractability, lack of focus, anxiety , adjustment, work stress  Primary focus: see above  Psychotherapeutic techniques: encouraged self-disclosure, active listening and feedback, reframing, encouragement of self care    Outcome monitoring methods: self-report, lab data, observation    Patient's response to intervention:  The patient's response to intervention is motivated.    Progress toward goals:  The patient's progress toward goals is fair .      Review of Systems   GENERAL:  No significant wt gain or loss   SKIN:  No rashes or lacerations  HEAD:  No headaches  EYES:  No exophthalmos, jaundice or blindness  EARS:  No dizziness, tinnitus or hearing loss  NOSE:  No changes in smell  MOUTH & THROAT:  No dyskinetic movements or obvious goiter  CHEST:  No shortness of breath, hyperventilation or cough  CARDIOVASCULAR:  No tachycardia or chest pain  ABDOMEN:  No nausea, vomiting, pain, constipation or diarrhea  URINARY:  No frequency, dysuria or sexual dysfunction  ENDOCRINE:  No polydipsia, polyuria  MUSCULOSKELETAL:  No pain or stiffness of the joints  NEUROLOGIC:  No weakness, sensory changes, seizures, confusion, memory loss, tremor or other abnormal movements.     Past Psychiatric History:  Took Prozac and Zoloft in past (Prozac as a teen with ADHD).  Also, tried Lexapro, but this med did not work.      Past Medical, Family and  "Social History: The patient's past medical, family and social history have been reviewed and updated as appropriate within the electronic medical record -- see encounter notes.    Current Psychotropic Medications:    Effexor XR 75 mg daily  Prozac 20 mg daily as needed (FOR PMDD; TAKES 1 OUT OF EVERY 4 WEEKS)   Vistaril 25 mg QID prn anxiety   Ritalin 10 mg IR BID (TAKES PRN FOR INATTENTION)    Compliance:  Yes.      Side effects: None    Risk Parameters:  Patient reports no suicidal ideation  Patient reports no homicidal ideation  Patient reports no self-injurious behavior  Patient reports no violent behavior    Exam (detailed: at least 9 elements; comprehensive: all 15 elements)   Constitutional  Vitals:  Most recent vital signs, dated less than 90 days prior to this appointment, were reviewed.     Vitals - 1 value per visit 10/31/2018 2/6/2019   SYSTOLIC 135 126   DIASTOLIC 85 62   PULSE 94 91   Weight (lb) 182.54 181.22   Weight (kg) 82.8 82.2   HEIGHT 5' 3" 5' 3"   BODY MASS INDEX 32.34 32.1   VISIT REPORT     Pain Score          Vitals - 1 value per visit 3/26/2018 5/30/2018 6/18/2018 8/22/2018   SYSTOLIC 130 131  133   DIASTOLIC 83 84  74   PULSE 106 95  88   Weight (lb) 177.91 181.22 180.78 181.33   Weight (kg) 80.7 82.2 82 82.25   HEIGHT 5' 4" 5' 4" 5' 3" 5' 3"   BODY MASS INDEX 30.54 31.11 32.02 32.12   VISIT REPORT       Pain Score    0        Vitals - 1 value per visit 11/15/2017 1/10/2018 1/15/2018   SYSTOLIC 130 135 116   DIASTOLIC 80 78 88   PULSE  99    Weight (lb) 181 183 182.32   Weight (kg) 82.1 83.008 82.7   HEIGHT 5' 4" 5' 4" 5' 4"   BODY MASS INDEX 31.07 31.41 31.3   VISIT REPORT      Pain Score  0  0       Vitals - 1 value per visit 6/26/2017 7/19/2017 10/18/2017   SYSTOLIC 118 139 137   DIASTOLIC 76 86 83   PULSE  105 84   Weight (lb) 178.57 180 180   Weight (kg) 81 81.647 81.647   HEIGHT 5' 4" 5' 4" 5' 4"   BODY MASS INDEX 30.65 30.9 30.9   VISIT REPORT      Pain Score  0         Vitals - 1 " "value per visit 12/8/2016 2/14/2017 5/4/2017   SYSTOLIC 133 132 130   DIASTOLIC 82 81 80   PULSE 109 105 94   Weight (lb) 173 172 178   Weight (kg) 78.472 78.019 80.74   HEIGHT 5' 4" 5' 4" 5' 4"   BODY MASS INDEX 29.7 29.52 30.55   VISIT REPORT      Pain Score            General:  unremarkable, age appropriate, well nourished, casually dressed, neatly groomed, pleasant, cooperative, good eye contact, engages well in conversation     Musculoskeletal  Muscle Strength/Tone:  not examined   Gait & Station:  non-ataxic     Psychiatric  Speech:  no latency; no press, spontaneous, good articulation   Mood & Affect:  mildly anxious  congruent and appropriate   Thought Process:  goal-directed, mildly circumstantial   Associations:  mildly circumstantial   Thought Content:  normal, no suicidality, no homicidality, delusions, or paranoia   Insight:  intact, has awareness of illness   Judgement: behavior is adequate to circumstances   Orientation:  grossly intact   Memory: intact for content of interview   Language: grossly intact   Attention Span & Concentration:  able to focus   Fund of Knowledge:  intact and appropriate to age and level of education     Assessment and Diagnosis   Status/Progress: Based on the examination today, the patient's problem(s) is/are adequately but not ideally controlled.  New problems have been presented today.   Co-morbidities are not complicating management of the primary condition.  There are no active rule-out diagnoses for this patient at this time.     General Impression:   ADHD, inattentive type  Anxiety Disorder, unspecified  PMDD  Depressive Disorder, unspecified (R/O Hx of dysthymic disorder or MDD)     Intervention/Counseling/Treatment Plan   Medication Management:  Continue Ritalin IR 10 mg TWICE DAILY as needed for concentration.  Three Rx for Ritalin, for a 30 day supply with no refills & with "Do not fill until" date posted accordingly, were given to patient.   Risks and benefits of " psychostimulants have been noted, including potential increased anxiety/agitation, tremor, initial insomnia, potential rapid cycling of mood in a bipolar patient, and possible tolerance and dependence to develop.  Also, strict LEIGH regulations for this medication have been noted, as well as recommendation to keep the medication secured, as no Rx would be written for patient if the Rx or supply of medication were lost or stolen.    --Continue Prozac 20 mg one capsule daily PRN for PMDD.    --Continue other current medications as noted above.     Return to Clinic: 3 months, or sooner prn.

## 2019-02-27 ENCOUNTER — OFFICE VISIT (OUTPATIENT)
Dept: URGENT CARE | Facility: CLINIC | Age: 34
End: 2019-02-27
Payer: COMMERCIAL

## 2019-02-27 VITALS
HEIGHT: 63 IN | RESPIRATION RATE: 19 BRPM | SYSTOLIC BLOOD PRESSURE: 130 MMHG | HEART RATE: 92 BPM | WEIGHT: 180 LBS | OXYGEN SATURATION: 97 % | BODY MASS INDEX: 31.89 KG/M2 | TEMPERATURE: 97 F | DIASTOLIC BLOOD PRESSURE: 81 MMHG

## 2019-02-27 DIAGNOSIS — J06.9 VIRAL URI WITH COUGH: Primary | ICD-10-CM

## 2019-02-27 PROCEDURE — 3008F PR BODY MASS INDEX (BMI) DOCUMENTED: ICD-10-PCS | Mod: CPTII,S$GLB,, | Performed by: NURSE PRACTITIONER

## 2019-02-27 PROCEDURE — 99203 OFFICE O/P NEW LOW 30 MIN: CPT | Mod: S$GLB,,, | Performed by: NURSE PRACTITIONER

## 2019-02-27 PROCEDURE — 3008F BODY MASS INDEX DOCD: CPT | Mod: CPTII,S$GLB,, | Performed by: NURSE PRACTITIONER

## 2019-02-27 PROCEDURE — 99203 PR OFFICE/OUTPT VISIT, NEW, LEVL III, 30-44 MIN: ICD-10-PCS | Mod: S$GLB,,, | Performed by: NURSE PRACTITIONER

## 2019-02-27 RX ORDER — PROMETHAZINE HYDROCHLORIDE AND DEXTROMETHORPHAN HYDROBROMIDE 6.25; 15 MG/5ML; MG/5ML
5 SYRUP ORAL
Qty: 180 ML | Refills: 0 | Status: SHIPPED | OUTPATIENT
Start: 2019-02-27 | End: 2019-03-09

## 2019-02-27 NOTE — PROGRESS NOTES
"Subjective:       Patient ID: Caridad Panda is a 33 y.o. female.    Vitals:  height is 5' 3" (1.6 m) and weight is 81.6 kg (180 lb). Her oral temperature is 97.4 °F (36.3 °C). Her blood pressure is 130/81 and her pulse is 92. Her respiration is 19 and oxygen saturation is 95%.     Chief Complaint: URI (cough, sore throat, congestion, )    URI    This is a new problem. The current episode started in the past 7 days (Thursday). The problem has been unchanged. There has been no fever. Associated symptoms include congestion, coughing, rhinorrhea and a sore throat. Pertinent negatives include no chest pain, diarrhea, dysuria, headaches, nausea, rash or vomiting. Treatments tried: dayquil, Nyquil, zyrtec. The treatment provided moderate relief.       Constitution: Negative for chills, fatigue and fever.   HENT: Positive for congestion and sore throat.    Neck: Negative for painful lymph nodes.   Cardiovascular: Negative for chest pain and leg swelling.   Eyes: Negative for double vision and blurred vision.   Respiratory: Positive for cough. Negative for shortness of breath.    Gastrointestinal: Negative for nausea, vomiting and diarrhea.   Genitourinary: Negative for dysuria, frequency, urgency and history of kidney stones.   Musculoskeletal: Negative for joint pain, joint swelling, muscle cramps and muscle ache.   Skin: Negative for color change, pale, rash and bruising.   Allergic/Immunologic: Negative for seasonal allergies.   Neurological: Negative for dizziness, history of vertigo, light-headedness, passing out and headaches.   Hematologic/Lymphatic: Negative for swollen lymph nodes.   Psychiatric/Behavioral: Negative for nervous/anxious, sleep disturbance and depression. The patient is not nervous/anxious.        Objective:      Physical Exam   Constitutional: She is oriented to person, place, and time. She appears well-developed and well-nourished. She is cooperative.  Non-toxic appearance. She does not " appear ill. No distress.   HENT:   Head: Normocephalic and atraumatic.   Right Ear: Hearing, tympanic membrane, external ear and ear canal normal.   Left Ear: Hearing, tympanic membrane, external ear and ear canal normal.   Nose: Nose normal. No mucosal edema, rhinorrhea or nasal deformity. No epistaxis. Right sinus exhibits no maxillary sinus tenderness and no frontal sinus tenderness. Left sinus exhibits no maxillary sinus tenderness and no frontal sinus tenderness.   Mouth/Throat: Uvula is midline, oropharynx is clear and moist and mucous membranes are normal. No trismus in the jaw. Normal dentition. No uvula swelling. No posterior oropharyngeal erythema.   Eyes: Conjunctivae and lids are normal. No scleral icterus.   Sclera clear bilat   Neck: Trachea normal, full passive range of motion without pain and phonation normal. Neck supple.   Cardiovascular: Normal rate, regular rhythm, normal heart sounds, intact distal pulses and normal pulses.   Pulmonary/Chest: Effort normal and breath sounds normal. No respiratory distress.   Abdominal: Soft. Normal appearance and bowel sounds are normal. She exhibits no distension. There is no tenderness.   Musculoskeletal: Normal range of motion. She exhibits no edema or deformity.   Neurological: She is alert and oriented to person, place, and time. She exhibits normal muscle tone. Coordination normal.   Skin: Skin is warm, dry and intact. She is not diaphoretic. No pallor.   Psychiatric: She has a normal mood and affect. Her speech is normal and behavior is normal. Judgment and thought content normal. Cognition and memory are normal.   Nursing note and vitals reviewed.      Assessment:       1. Viral URI with cough        Plan:         Viral URI with cough    Other orders  -     promethazine-dextromethorphan (PROMETHAZINE-DM) 6.25-15 mg/5 mL Syrp; Take 5 mLs by mouth every 4 to 6 hours as needed (cough). Can cause drowsiness  Dispense: 180 mL; Refill: 0            Patient  Instructions     Cough syrup may cause drowsiness.   Pickup decongestant and Flonase.    If you were prescribed a narcotic medication, do not drive or operate heavy equipment or machinery while taking these medications.    You must understand that you've received an Urgent Care treatment only and that you may be released before all your medical problems are known or treated. You, the patient, will arrange for follow up care as instructed.  If your condition worsens we recommend that you receive another evaluation at the emergency room immediately or contact your primary medical clinics after hours call service to discuss your concerns.  Please return here or go to the Emergency Department for any concerns or worsening of condition.      Viral Upper Respiratory Illness (Adult)  You have a viral upper respiratory illness (URI), which is another term for the common cold. This illness is contagious during the first few days. It is spread through the air by coughing and sneezing. It may also be spread by direct contact (touching the sick person and then touching your own eyes, nose, or mouth). Frequent handwashing will decrease risk of spread. Most viral illnesses go away within 7 to 10 days with rest and simple home remedies. Sometimes the illness may last for several weeks. Antibiotics will not kill a virus, and they are generally not prescribed for this condition.    Home care  · If symptoms are severe, rest at home for the first 2 to 3 days. When you resume activity, don't let yourself get too tired.  · Avoid being exposed to cigarette smoke (yours or others).  · You may use acetaminophen or ibuprofen to control pain and fever, unless another medicine was prescribed. (Note: If you have chronic liver or kidney disease, have ever had a stomach ulcer or gastrointestinal bleeding, or are taking blood-thinning medicines, talk with your healthcare provider before using these medicines.) Aspirin should never be given to  anyone under 18 years of age who is ill with a viral infection or fever. It may cause severe liver or brain damage.  · Your appetite may be poor, so a light diet is fine. Avoid dehydration by drinking 6 to 8 glasses of fluids per day (water, soft drinks, juices, tea, or soup). Extra fluids will help loosen secretions in the nose and lungs.  · Over-the-counter cold medicines will not shorten the length of time youre sick, but they may be helpful for the following symptoms: cough, sore throat, and nasal and sinus congestion. (Note: Do not use decongestants if you have high blood pressure.)  Follow-up care  Follow up with your healthcare provider, or as advised.  When to seek medical advice  Call your healthcare provider right away if any of these occur:  · Cough with lots of colored sputum (mucus)  · Severe headache; face, neck, or ear pain  · Difficulty swallowing due to throat pain  · Fever of 100.4°F (38°C)  Call 911, or get immediate medical care  Call emergency services right away if any of these occur:  · Chest pain, shortness of breath, wheezing, or difficulty breathing  · Coughing up blood  · Inability to swallow due to throat pain  Date Last Reviewed: 9/13/2015  © 3605-9098 [employer]. [address] [place] [address], [place] [address]. All rights reserved. This information is not intended as a substitute for professional medical care. Always follow your healthcare professional's instructions.

## 2019-02-27 NOTE — PATIENT INSTRUCTIONS
Cough syrup may cause drowsiness.   Pickup decongestant and Flonase.    If you were prescribed a narcotic medication, do not drive or operate heavy equipment or machinery while taking these medications.    You must understand that you've received an Urgent Care treatment only and that you may be released before all your medical problems are known or treated. You, the patient, will arrange for follow up care as instructed.  If your condition worsens we recommend that you receive another evaluation at the emergency room immediately or contact your primary medical clinics after hours call service to discuss your concerns.  Please return here or go to the Emergency Department for any concerns or worsening of condition.      Viral Upper Respiratory Illness (Adult)  You have a viral upper respiratory illness (URI), which is another term for the common cold. This illness is contagious during the first few days. It is spread through the air by coughing and sneezing. It may also be spread by direct contact (touching the sick person and then touching your own eyes, nose, or mouth). Frequent handwashing will decrease risk of spread. Most viral illnesses go away within 7 to 10 days with rest and simple home remedies. Sometimes the illness may last for several weeks. Antibiotics will not kill a virus, and they are generally not prescribed for this condition.    Home care  · If symptoms are severe, rest at home for the first 2 to 3 days. When you resume activity, don't let yourself get too tired.  · Avoid being exposed to cigarette smoke (yours or others).  · You may use acetaminophen or ibuprofen to control pain and fever, unless another medicine was prescribed. (Note: If you have chronic liver or kidney disease, have ever had a stomach ulcer or gastrointestinal bleeding, or are taking blood-thinning medicines, talk with your healthcare provider before using these medicines.) Aspirin should never be given to anyone under 18  years of age who is ill with a viral infection or fever. It may cause severe liver or brain damage.  · Your appetite may be poor, so a light diet is fine. Avoid dehydration by drinking 6 to 8 glasses of fluids per day (water, soft drinks, juices, tea, or soup). Extra fluids will help loosen secretions in the nose and lungs.  · Over-the-counter cold medicines will not shorten the length of time youre sick, but they may be helpful for the following symptoms: cough, sore throat, and nasal and sinus congestion. (Note: Do not use decongestants if you have high blood pressure.)  Follow-up care  Follow up with your healthcare provider, or as advised.  When to seek medical advice  Call your healthcare provider right away if any of these occur:  · Cough with lots of colored sputum (mucus)  · Severe headache; face, neck, or ear pain  · Difficulty swallowing due to throat pain  · Fever of 100.4°F (38°C)  Call 911, or get immediate medical care  Call emergency services right away if any of these occur:  · Chest pain, shortness of breath, wheezing, or difficulty breathing  · Coughing up blood  · Inability to swallow due to throat pain  Date Last Reviewed: 9/13/2015  © 2054-1385 [employer]. [address] [place] [address], [place] [address]. All rights reserved. This information is not intended as a substitute for professional medical care. Always follow your healthcare professional's instructions.

## 2019-04-02 ENCOUNTER — OFFICE VISIT (OUTPATIENT)
Dept: PSYCHIATRY | Facility: CLINIC | Age: 34
End: 2019-04-02
Payer: COMMERCIAL

## 2019-04-02 VITALS
WEIGHT: 179.88 LBS | DIASTOLIC BLOOD PRESSURE: 75 MMHG | SYSTOLIC BLOOD PRESSURE: 123 MMHG | HEART RATE: 88 BPM | HEIGHT: 63 IN | BODY MASS INDEX: 31.87 KG/M2

## 2019-04-02 DIAGNOSIS — F32.A DEPRESSION, UNSPECIFIED DEPRESSION TYPE: ICD-10-CM

## 2019-04-02 DIAGNOSIS — F41.9 ANXIETY DISORDER, UNSPECIFIED TYPE: ICD-10-CM

## 2019-04-02 DIAGNOSIS — F90.0 ADHD, PREDOMINANTLY INATTENTIVE TYPE: ICD-10-CM

## 2019-04-02 DIAGNOSIS — F32.81 PMDD (PREMENSTRUAL DYSPHORIC DISORDER): ICD-10-CM

## 2019-04-02 DIAGNOSIS — F90.0 ATTENTION DEFICIT HYPERACTIVITY DISORDER (ADHD), PREDOMINANTLY INATTENTIVE TYPE: Primary | ICD-10-CM

## 2019-04-02 DIAGNOSIS — F41.9 ANXIETY: ICD-10-CM

## 2019-04-02 PROCEDURE — 90833 PSYTX W PT W E/M 30 MIN: CPT | Mod: S$GLB,,, | Performed by: PSYCHIATRY & NEUROLOGY

## 2019-04-02 PROCEDURE — 99999 PR PBB SHADOW E&M-EST. PATIENT-LVL III: CPT | Mod: PBBFAC,,, | Performed by: PSYCHIATRY & NEUROLOGY

## 2019-04-02 PROCEDURE — 99213 PR OFFICE/OUTPT VISIT, EST, LEVL III, 20-29 MIN: ICD-10-PCS | Mod: S$GLB,,, | Performed by: PSYCHIATRY & NEUROLOGY

## 2019-04-02 PROCEDURE — 99999 PR PBB SHADOW E&M-EST. PATIENT-LVL III: ICD-10-PCS | Mod: PBBFAC,,, | Performed by: PSYCHIATRY & NEUROLOGY

## 2019-04-02 PROCEDURE — 3008F BODY MASS INDEX DOCD: CPT | Mod: CPTII,S$GLB,, | Performed by: PSYCHIATRY & NEUROLOGY

## 2019-04-02 PROCEDURE — 90833 PR PSYCHOTHERAPY W/PATIENT W/E&M, 30 MIN (ADD ON): ICD-10-PCS | Mod: S$GLB,,, | Performed by: PSYCHIATRY & NEUROLOGY

## 2019-04-02 PROCEDURE — 3008F PR BODY MASS INDEX (BMI) DOCUMENTED: ICD-10-PCS | Mod: CPTII,S$GLB,, | Performed by: PSYCHIATRY & NEUROLOGY

## 2019-04-02 PROCEDURE — 99213 OFFICE O/P EST LOW 20 MIN: CPT | Mod: S$GLB,,, | Performed by: PSYCHIATRY & NEUROLOGY

## 2019-04-02 RX ORDER — METHYLPHENIDATE HYDROCHLORIDE 10 MG/1
10 TABLET ORAL 2 TIMES DAILY
Qty: 60 TABLET | Refills: 0 | Status: SHIPPED | OUTPATIENT
Start: 2019-05-06 | End: 2019-07-30 | Stop reason: SDUPTHER

## 2019-04-02 RX ORDER — HYDROXYZINE PAMOATE 25 MG/1
25 CAPSULE ORAL 4 TIMES DAILY PRN
Qty: 60 CAPSULE | Refills: 3 | Status: SHIPPED | OUTPATIENT
Start: 2019-04-02

## 2019-04-02 RX ORDER — VENLAFAXINE HYDROCHLORIDE 75 MG/1
CAPSULE, EXTENDED RELEASE ORAL
Qty: 90 CAPSULE | Refills: 1 | Status: SHIPPED | OUTPATIENT
Start: 2019-04-02 | End: 2019-07-30 | Stop reason: SDUPTHER

## 2019-04-02 RX ORDER — METHYLPHENIDATE HYDROCHLORIDE 10 MG/1
10 TABLET ORAL 2 TIMES DAILY
Qty: 60 TABLET | Refills: 0 | Status: SHIPPED | OUTPATIENT
Start: 2019-06-05 | End: 2019-07-30 | Stop reason: SDUPTHER

## 2019-04-02 NOTE — PROGRESS NOTES
Outpatient Psychiatry Follow-Up Visit (MD/NP)    4/2/2019    Clinical Status of Patient:  Outpatient (Ambulatory)     Session Length:  30 mins (E&M plus psychotherapy)     Chief Complaint:  Caridad Panda is a 33 y.o. female who presents today for follow-up of anxiety and adjustment problems.    Met with patient.      Interval History and Content of Current Session:  Interim Events/Subjective Report/Content of Current Session:  First f/u with me since appt on 2/6/2019 -- please see my note in pt's Epic medical record.      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  She states business was slow and she often was hard pressed to find work to do.    She also commented that others have told her that she talks too much.      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      She had an interview at [employer] [facility] [employer] [facility] [employer] [facility] [place] for dental hygiene school -- she thinks it went OK.    She is also currently taking 3 pre-rec courses at [employer].    (she had mentioned sitting for the [employer] in the past, but we did not discuss this today)    She is still living in [place].   Her roommate just moved out -- she paid April rent and her bedroom furniture is still in her room.    Pt states her roommate is getting .    She notes frustration with her roommate -- she was messy and often did not clean after self.    Then, her roommate told a close female relative that she could sleep in her bedroom in the apt WITHOUT asking pt's permission first -- this significant upset pt.      She states she has been taking the Ritalin just when she is studying.  It has helped well with concentration and studying.  She denies AE's.   She has NOT needed medication lately for sleep.    She continues to take the  Effexor XR 75 mg capsule nightly.  If she forgets to take the dose the night before, she will have a bad migraine with nausea the next AM.    She also has PMDD -- gets more anxious, emotionally reactive for a few days prior to menses each month.  She takes Prozac 20 mg on those days and thinks it helps (averages about 5 - 7 days a month that she takes the Prozac).     She sees Dr. Monahan (neurologist) at [facility] to help manage her migraine headaches.  She states she had her first migraine headache in 4th grade.    At times she has had severe migraines, has had to take ibuprofen, lie down and sleep to break it.    She snores, likely has JAVIER.  Her mom and brother also have JAVIER.  I have encouraged her to see a sleep specialist.      She had met with a SW (Estephanie Dunaway, [facility]; #[phone]) for psychotherapy (not meeting with her recently due to cost).         From previous sessions:  Her mom often refuses to take her medication for no apparent reason.  She and her brother both have confronted her mom on numerous occasions when this has happened.    Her mom had a CVA in the past.  She needs to take meds to help prevent having another CVA.  She also has OCD and depression -- she is supposed to take an SSRI from the mental health clinic where she goes.   Pt has denied any personal Hx of heart disease.  Regarding family cardiac Hx: her mom has Hx of a-fib and her maternal gm had a recent TIA.    Her father  from an MI in his 40's and her paternal gf  from MI in his 50's.     Her brother has been evaluated by a cardiologist as a precaution because of family history (he currently has no signs of heart disease).        PSYCHOTHERAPY ADD-ON [id number]   30 (16-37*) minutes    Site: [facility], [address]  Time: 20 minutes  Participants: Met with patient    Therapeutic Intervention Type: insight oriented psychotherapy, supportive psychotherapy  Why chosen therapy is appropriate versus another modality:  relevant to diagnosis, patient responds to this modality    Target symptoms: depression, distractability, lack of focus, anxiety , adjustment, work stress  Primary focus: see above  Psychotherapeutic techniques: encouraged self-disclosure, active listening and feedback, reframing, encouragement of self care    Outcome monitoring methods: self-report, lab data, observation    Patient's response to intervention:  The patient's response to intervention is motivated.    Progress toward goals:  The patient's progress toward goals is fair .      Review of Systems   GENERAL:  No significant wt gain or loss   SKIN:  No rashes or lacerations  HEAD:  No headaches  EYES:  No exophthalmos, jaundice or blindness  EARS:  No dizziness, tinnitus or hearing loss  NOSE:  No changes in smell  MOUTH & THROAT:  No dyskinetic movements or obvious goiter  CHEST:  No shortness of breath, hyperventilation or cough  CARDIOVASCULAR:  No tachycardia or chest pain  ABDOMEN:  No nausea, vomiting, pain, constipation or diarrhea  URINARY:  No frequency, dysuria or sexual dysfunction  ENDOCRINE:  No polydipsia, polyuria  MUSCULOSKELETAL:  No pain or stiffness of the joints  NEUROLOGIC:  No weakness, sensory changes, seizures, confusion, memory loss, tremor or other abnormal movements.     Past Psychiatric History:  Took Prozac and Zoloft in past (Prozac as a teen with ADHD).  Also, tried Lexapro, but this med did not work.      Past Medical, Family and Social History: The patient's past medical, family and social history have been reviewed and updated as appropriate within the electronic medical record -- see encounter notes.    Current Psychotropic Medications:    Effexor XR 75 mg daily  Prozac 20 mg daily as needed (FOR PMDD; TAKES 1 OUT OF EVERY 4 WEEKS)   Vistaril 25 mg QID prn anxiety   Ritalin 10 mg IR BID (TAKES PRN FOR INATTENTION)  (zaleplon had been Rx to pt over a year ago; pt has not needed to take a sleep aid recently)    Compliance:   "Yes.      Side effects: None    Risk Parameters:  Patient reports no suicidal ideation  Patient reports no homicidal ideation  Patient reports no self-injurious behavior  Patient reports no violent behavior    Exam (detailed: at least 9 elements; comprehensive: all 15 elements)   Constitutional  Vitals:  Most recent vital signs, dated less than 90 days prior to this appointment, were reviewed.     Vitals - 1 value per visit 10/31/2018 2/6/2019 2/27/2019 4/2/2019   SYSTOLIC 135 126 130 123   DIASTOLIC 85 62 81 75   PULSE 94 91 92 88   TEMPERATURE   97.4    RESPIRATIONS   19    SPO2   97    Weight (lb) 182.54 181.22 180 179.9   Weight (kg) 82.8 82.2 81.647 81.6   HEIGHT 5' 3" 5' 3" 5' 3" 5' 3"   BODY MASS INDEX 32.34 32.1 31.89 31.87   VISIT REPORT       Pain Score            Vitals - 1 value per visit 3/26/2018 5/30/2018 6/18/2018 8/22/2018   SYSTOLIC 130 131  133   DIASTOLIC 83 84  74   PULSE 106 95  88   Weight (lb) 177.91 181.22 180.78 181.33   Weight (kg) 80.7 82.2 82 82.25   HEIGHT 5' 4" 5' 4" 5' 3" 5' 3"   BODY MASS INDEX 30.54 31.11 32.02 32.12   VISIT REPORT       Pain Score    0        Vitals - 1 value per visit 11/15/2017 1/10/2018 1/15/2018   SYSTOLIC 130 135 116   DIASTOLIC 80 78 88   PULSE  99    Weight (lb) 181 183 182.32   Weight (kg) 82.1 83.008 82.7   HEIGHT 5' 4" 5' 4" 5' 4"   BODY MASS INDEX 31.07 31.41 31.3   VISIT REPORT      Pain Score  0  0       Vitals - 1 value per visit 6/26/2017 7/19/2017 10/18/2017   SYSTOLIC 118 139 137   DIASTOLIC 76 86 83   PULSE  105 84   Weight (lb) 178.57 180 180   Weight (kg) 81 81.647 81.647   HEIGHT 5' 4" 5' 4" 5' 4"   BODY MASS INDEX 30.65 30.9 30.9   VISIT REPORT      Pain Score  0         Vitals - 1 value per visit 12/8/2016 2/14/2017 5/4/2017   SYSTOLIC 133 132 130   DIASTOLIC 82 81 80   PULSE 109 105 94   Weight (lb) 173 172 178   Weight (kg) 78.472 78.019 80.74   HEIGHT 5' 4" 5' 4" 5' 4"   BODY MASS INDEX 29.7 29.52 30.55   VISIT REPORT      Pain Score     " "       General:  unremarkable, age appropriate, well nourished, casually dressed, neatly groomed, pleasant, cooperative, good eye contact, engages well in conversation     Musculoskeletal  Muscle Strength/Tone:  not examined   Gait & Station:  non-ataxic     Psychiatric  Speech:  no latency; no press, spontaneous, good articulation   Mood & Affect:  mildly anxious  congruent and appropriate   Thought Process:  goal-directed, mildly circumstantial   Associations:  mildly circumstantial   Thought Content:  normal, no suicidality, no homicidality, delusions, or paranoia   Insight:  intact, has awareness of illness   Judgement: behavior is adequate to circumstances   Orientation:  grossly intact   Memory: intact for content of interview   Language: grossly intact   Attention Span & Concentration:  able to focus   Fund of Knowledge:  intact and appropriate to age and level of education     Assessment and Diagnosis   Status/Progress: Based on the examination today, the patient's problem(s) is/are adequately but not ideally controlled.  New problems have been presented today.   Co-morbidities are not complicating management of the primary condition.  There are no active rule-out diagnoses for this patient at this time.     General Impression:   ADHD, inattentive type  Anxiety Disorder, unspecified  Pre-menstrual dysphoric disorder  Depressive Disorder, unspecified (R/O Hx of dysthymic disorder or MDD)     Intervention/Counseling/Treatment Plan   Medication Management:  Continue Ritalin IR 10 mg TWICE DAILY as needed for concentration.  Three Rx for Ritalin, for a 30 day supply with no refills & with "Do not fill until" date posted accordingly, were given to patient.   Risks and benefits of psychostimulants have been noted, including potential increased anxiety/agitation, tremor, initial insomnia, potential rapid cycling of mood in a bipolar patient, and possible tolerance and dependence to develop.  Also, strict LEIGH " regulations for this medication have been noted, as well as recommendation to keep the medication secured, as no Rx would be written for patient if the Rx or supply of medication were lost or stolen.    --Continue Prozac 20 mg one capsule daily PRN for PMDD.    --Continue other current medications as noted above.     Return to Clinic: 3 months, or sooner prn.

## 2019-05-16 ENCOUNTER — OFFICE VISIT (OUTPATIENT)
Dept: URGENT CARE | Facility: CLINIC | Age: 34
End: 2019-05-16
Payer: COMMERCIAL

## 2019-05-16 VITALS
SYSTOLIC BLOOD PRESSURE: 131 MMHG | RESPIRATION RATE: 16 BRPM | HEART RATE: 97 BPM | HEIGHT: 63 IN | OXYGEN SATURATION: 100 % | BODY MASS INDEX: 31.71 KG/M2 | WEIGHT: 179 LBS | DIASTOLIC BLOOD PRESSURE: 87 MMHG | TEMPERATURE: 98 F

## 2019-05-16 DIAGNOSIS — J30.9 ALLERGIC RHINITIS, UNSPECIFIED SEASONALITY, UNSPECIFIED TRIGGER: ICD-10-CM

## 2019-05-16 DIAGNOSIS — B37.0 ORAL THRUSH: Primary | ICD-10-CM

## 2019-05-16 PROCEDURE — 3008F BODY MASS INDEX DOCD: CPT | Mod: CPTII,S$GLB,, | Performed by: INTERNAL MEDICINE

## 2019-05-16 PROCEDURE — 3008F PR BODY MASS INDEX (BMI) DOCUMENTED: ICD-10-PCS | Mod: CPTII,S$GLB,, | Performed by: INTERNAL MEDICINE

## 2019-05-16 PROCEDURE — 99214 OFFICE O/P EST MOD 30 MIN: CPT | Mod: S$GLB,,, | Performed by: INTERNAL MEDICINE

## 2019-05-16 PROCEDURE — 99214 PR OFFICE/OUTPT VISIT, EST, LEVL IV, 30-39 MIN: ICD-10-PCS | Mod: S$GLB,,, | Performed by: INTERNAL MEDICINE

## 2019-05-16 RX ORDER — NYSTATIN 100000 [USP'U]/ML
5 SUSPENSION ORAL 4 TIMES DAILY
Qty: 200 ML | Refills: 0 | Status: SHIPPED | OUTPATIENT
Start: 2019-05-16 | End: 2019-05-26

## 2019-05-16 NOTE — PROGRESS NOTES
"Subjective:       Patient ID: Caridad Panda is a 34 y.o. female.    Vitals:  height is 5' 3" (1.6 m) and weight is 81.2 kg (179 lb). Her oral temperature is 97.5 °F (36.4 °C). Her blood pressure is 131/87 and her pulse is 97. Her respiration is 16 and oxygen saturation is 100%.     Chief Complaint: Thrush    Oral Pain    This is a new problem. The current episode started 1 to 4 weeks ago. The problem occurs constantly. The problem has been unchanged. The pain is at a severity of 4/10. The pain is moderate. Pertinent negatives include no fever. Treatments tried: Nystatin. The treatment provided mild relief.       Constitution: Negative for chills, fatigue and fever.   HENT: Positive for ear pain and sore throat. Negative for congestion.    Neck: Negative for painful lymph nodes.   Cardiovascular: Negative for chest pain and leg swelling.   Eyes: Negative for double vision and blurred vision.   Respiratory: Negative for cough and shortness of breath.    Gastrointestinal: Negative for nausea, vomiting and diarrhea.   Genitourinary: Negative for dysuria, frequency, urgency and history of kidney stones.   Musculoskeletal: Negative for joint pain, joint swelling, muscle cramps and muscle ache.   Skin: Negative for color change, pale, rash and bruising.   Allergic/Immunologic: Negative for seasonal allergies.   Neurological: Negative for dizziness, history of vertigo, light-headedness, passing out and headaches.   Hematologic/Lymphatic: Negative for swollen lymph nodes.   Psychiatric/Behavioral: Negative for nervous/anxious, sleep disturbance and depression. The patient is not nervous/anxious.        Objective:      Physical Exam   Constitutional: She appears well-developed and well-nourished.   HENT:   Head: Normocephalic and atraumatic.   Nose: Mucosal edema and rhinorrhea present.   Whitish plaques oral mucosa   Eyes: Pupils are equal, round, and reactive to light. Conjunctivae and EOM are normal.   Neck: Normal " range of motion. Neck supple.   Nursing note and vitals reviewed.      Assessment:       1. Oral thrush    2. Allergic rhinitis, unspecified seasonality, unspecified trigger        Plan:         Oral thrush  -     nystatin (MYCOSTATIN) 100,000 unit/mL suspension; Take 5 mLs (500,000 Units total) by mouth 4 (four) times daily. for 10 days  Dispense: 200 mL; Refill: 0    Allergic rhinitis, unspecified seasonality, unspecified trigger

## 2019-06-21 ENCOUNTER — OFFICE VISIT (OUTPATIENT)
Dept: INTERNAL MEDICINE | Facility: CLINIC | Age: 34
End: 2019-06-21
Payer: COMMERCIAL

## 2019-06-21 ENCOUNTER — IMMUNIZATION (OUTPATIENT)
Dept: PHARMACY | Facility: CLINIC | Age: 34
End: 2019-06-21

## 2019-06-21 ENCOUNTER — IMMUNIZATION (OUTPATIENT)
Dept: PHARMACY | Facility: CLINIC | Age: 34
End: 2019-06-21
Payer: COMMERCIAL

## 2019-06-21 ENCOUNTER — LAB VISIT (OUTPATIENT)
Dept: LAB | Facility: HOSPITAL | Age: 34
End: 2019-06-21
Payer: COMMERCIAL

## 2019-06-21 VITALS
HEART RATE: 94 BPM | BODY MASS INDEX: 30.58 KG/M2 | WEIGHT: 172.63 LBS | OXYGEN SATURATION: 98 % | DIASTOLIC BLOOD PRESSURE: 84 MMHG | SYSTOLIC BLOOD PRESSURE: 136 MMHG

## 2019-06-21 DIAGNOSIS — Z23 IMMUNIZATION, VARICELLA: ICD-10-CM

## 2019-06-21 DIAGNOSIS — J30.9 ALLERGIC RHINITIS, UNSPECIFIED SEASONALITY, UNSPECIFIED TRIGGER: Primary | ICD-10-CM

## 2019-06-21 DIAGNOSIS — Z78.9 HEPATITIS B VACCINATION STATUS UNKNOWN: ICD-10-CM

## 2019-06-21 DIAGNOSIS — Z11.1 VISIT FOR TB SKIN TEST: ICD-10-CM

## 2019-06-21 DIAGNOSIS — Z23 NEED FOR TETANUS BOOSTER: ICD-10-CM

## 2019-06-21 PROCEDURE — 86706 HEP B SURFACE ANTIBODY: CPT

## 2019-06-21 PROCEDURE — 99999 PR PBB SHADOW E&M-EST. PATIENT-LVL III: ICD-10-PCS | Mod: PBBFAC,,, | Performed by: PHYSICIAN ASSISTANT

## 2019-06-21 PROCEDURE — 86787 VARICELLA-ZOSTER ANTIBODY: CPT

## 2019-06-21 PROCEDURE — 99214 OFFICE O/P EST MOD 30 MIN: CPT | Mod: S$GLB,,, | Performed by: PHYSICIAN ASSISTANT

## 2019-06-21 PROCEDURE — 99214 PR OFFICE/OUTPT VISIT, EST, LEVL IV, 30-39 MIN: ICD-10-PCS | Mod: S$GLB,,, | Performed by: PHYSICIAN ASSISTANT

## 2019-06-21 PROCEDURE — 3008F PR BODY MASS INDEX (BMI) DOCUMENTED: ICD-10-PCS | Mod: CPTII,S$GLB,, | Performed by: PHYSICIAN ASSISTANT

## 2019-06-21 PROCEDURE — 3008F BODY MASS INDEX DOCD: CPT | Mod: CPTII,S$GLB,, | Performed by: PHYSICIAN ASSISTANT

## 2019-06-21 PROCEDURE — 99999 PR PBB SHADOW E&M-EST. PATIENT-LVL III: CPT | Mod: PBBFAC,,, | Performed by: PHYSICIAN ASSISTANT

## 2019-06-21 RX ORDER — AZELASTINE 1 MG/ML
1 SPRAY, METERED NASAL 2 TIMES DAILY PRN
Qty: 30 ML | Refills: 2 | Status: SHIPPED | OUTPATIENT
Start: 2019-06-21 | End: 2019-09-13 | Stop reason: SDUPTHER

## 2019-06-21 NOTE — PROGRESS NOTES
Subjective:       Patient ID: Caridad aPnda is a 34 y.o. female.    Chief Complaint: Immunizations    HPI     Established pt of Primary Doctor No (new to me)    Here for immunization titers, TB skin Test, Tetanus and Varicella vaccine.     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     Also c/o allergic rhinitis, desires a refill of Astelin.     Review of patient's allergies indicates:   Allergen Reactions    Iodine and iodide containing products Itching and Swelling    Shellfish containing products Hives, Itching and Nausea And Vomiting     Past Medical History:   Diagnosis Date    Anxiety     Depression      Social History     Tobacco Use    Smoking status: Never Smoker    Smokeless tobacco: Never Used   Substance Use Topics    Alcohol use: Yes     Comment: SOCIALLY, 1.5 pitchers of beer every 2 weeks    Drug use: No           Review of Systems   Constitutional: Negative for chills, fever and unexpected weight change.   Respiratory: Negative for cough and shortness of breath.    Cardiovascular: Negative for chest pain and leg swelling.   Gastrointestinal: Negative for abdominal pain, nausea and vomiting.   Skin: Negative for rash.   Neurological: Negative for weakness, light-headedness and headaches.       Objective: /84   Pulse 94   Wt 78.3 kg (172 lb 9.9 oz)   SpO2 98%   BMI 30.58 kg/m²         Physical Exam   Constitutional: She appears well-developed and well-nourished. No distress.   HENT:   Head: Normocephalic and atraumatic.   Nose: Mucosal edema present.   Mouth/Throat: Oropharynx is clear and moist.   Cardiovascular: Normal rate and regular rhythm. Exam reveals no friction rub.   No murmur heard.  Pulmonary/Chest: Effort normal and breath sounds normal. She has no wheezes. She has no rales.   Neurological: She is alert.   Skin: Skin is warm and dry. No rash noted.   Vitals reviewed.      Assessment:       1. Allergic rhinitis, unspecified seasonality, unspecified  trigger    2. Visit for TB skin test    3. Need for tetanus booster    4. Immunization, varicella    5. Hepatitis B vaccination status unknown        Plan:           Caridad was seen today for immunizations.    Diagnoses and all orders for this visit:    Allergic rhinitis, unspecified seasonality, unspecified trigger  -     azelastine (ASTELIN) 137 mcg (0.1 %) nasal spray; 1 spray (137 mcg total) by Nasal route 2 (two) times daily as needed for Rhinitis.    Visit for TB skin test  -     POCT TB Skin Test Read  -     tuberculin 5 tub. unit /0.1 mL injection; Inject 0.1 mLs (5 Units total) into the skin once. for 1 dose    Need for tetanus booster  Today     Immunization, varicella  -     Varicella zoster antibody, IgG; Future    Hepatitis B vaccination status unknown  -     Hepatitis B Surface Antibody, Qual/Quant; Future    Daisy Lucero PA-C

## 2019-06-22 LAB
VARICELLA INTERPRETATION: POSITIVE
VARICELLA ZOSTER IGG: 3.61 ISR (ref 0–0.9)

## 2019-06-24 ENCOUNTER — PATIENT MESSAGE (OUTPATIENT)
Dept: INTERNAL MEDICINE | Facility: CLINIC | Age: 34
End: 2019-06-24

## 2019-06-25 ENCOUNTER — CLINICAL SUPPORT (OUTPATIENT)
Dept: INTERNAL MEDICINE | Facility: CLINIC | Age: 34
End: 2019-06-25
Payer: COMMERCIAL

## 2019-06-25 DIAGNOSIS — Z11.1 VISIT FOR TB SKIN TEST: Primary | ICD-10-CM

## 2019-06-25 LAB
HBV SURFACE AB SER QL IA: POSITIVE
HBV SURFACE AB SERPL IA-ACNC: NORMAL MIU/ML

## 2019-06-25 PROCEDURE — 99999 PR PBB SHADOW E&M-EST. PATIENT-LVL II: CPT | Mod: PBBFAC,,,

## 2019-06-25 PROCEDURE — 99999 PR PBB SHADOW E&M-EST. PATIENT-LVL II: ICD-10-PCS | Mod: PBBFAC,,,

## 2019-06-25 PROCEDURE — 86580 POCT TB SKIN TEST: ICD-10-PCS | Mod: S$GLB,,, | Performed by: PHYSICIAN ASSISTANT

## 2019-06-25 PROCEDURE — 86580 TB INTRADERMAL TEST: CPT | Mod: S$GLB,,, | Performed by: PHYSICIAN ASSISTANT

## 2019-06-28 ENCOUNTER — CLINICAL SUPPORT (OUTPATIENT)
Dept: INTERNAL MEDICINE | Facility: CLINIC | Age: 34
End: 2019-06-28
Payer: COMMERCIAL

## 2019-06-28 ENCOUNTER — PATIENT MESSAGE (OUTPATIENT)
Dept: INTERNAL MEDICINE | Facility: CLINIC | Age: 34
End: 2019-06-28
Payer: COMMERCIAL

## 2019-06-28 DIAGNOSIS — Z11.1 VISIT FOR TB SKIN TEST: Primary | ICD-10-CM

## 2019-06-28 DIAGNOSIS — Z11.1 PPD SCREENING TEST: Primary | ICD-10-CM

## 2019-06-28 DIAGNOSIS — Z11.1 SCREENING-PULMONARY TB: Primary | ICD-10-CM

## 2019-06-28 LAB
TB INDURATION - 48 HR READ: NORMAL MM
TB INDURATION - 48 HR READ: NORMAL MM
TB INDURATION - 72 HR READ: NORMAL MM
TB INDURATION - 72 HR READ: NORMAL MM
TB SKIN TEST - 48 HR READ: NORMAL
TB SKIN TEST - 48 HR READ: NORMAL
TB SKIN TEST - 72 HR READ: NORMAL
TB SKIN TEST - 72 HR READ: NORMAL

## 2019-07-24 ENCOUNTER — PATIENT MESSAGE (OUTPATIENT)
Dept: INTERNAL MEDICINE | Facility: CLINIC | Age: 34
End: 2019-07-24

## 2019-07-25 ENCOUNTER — PATIENT MESSAGE (OUTPATIENT)
Dept: INTERNAL MEDICINE | Facility: CLINIC | Age: 34
End: 2019-07-25

## 2019-07-30 ENCOUNTER — OFFICE VISIT (OUTPATIENT)
Dept: PSYCHIATRY | Facility: CLINIC | Age: 34
End: 2019-07-30
Payer: COMMERCIAL

## 2019-07-30 VITALS
BODY MASS INDEX: 31.07 KG/M2 | DIASTOLIC BLOOD PRESSURE: 79 MMHG | HEIGHT: 63 IN | WEIGHT: 175.38 LBS | SYSTOLIC BLOOD PRESSURE: 134 MMHG | HEART RATE: 104 BPM

## 2019-07-30 DIAGNOSIS — F41.9 ANXIETY: ICD-10-CM

## 2019-07-30 DIAGNOSIS — F90.0 ATTENTION DEFICIT HYPERACTIVITY DISORDER (ADHD), PREDOMINANTLY INATTENTIVE TYPE: Primary | ICD-10-CM

## 2019-07-30 DIAGNOSIS — F32.A DEPRESSION, UNSPECIFIED DEPRESSION TYPE: ICD-10-CM

## 2019-07-30 DIAGNOSIS — F90.0 ADHD, PREDOMINANTLY INATTENTIVE TYPE: ICD-10-CM

## 2019-07-30 DIAGNOSIS — F32.81 PMDD (PREMENSTRUAL DYSPHORIC DISORDER): ICD-10-CM

## 2019-07-30 PROCEDURE — 99213 OFFICE O/P EST LOW 20 MIN: CPT | Mod: S$GLB,,, | Performed by: PSYCHIATRY & NEUROLOGY

## 2019-07-30 PROCEDURE — 3008F PR BODY MASS INDEX (BMI) DOCUMENTED: ICD-10-PCS | Mod: CPTII,S$GLB,, | Performed by: PSYCHIATRY & NEUROLOGY

## 2019-07-30 PROCEDURE — 99213 PR OFFICE/OUTPT VISIT, EST, LEVL III, 20-29 MIN: ICD-10-PCS | Mod: S$GLB,,, | Performed by: PSYCHIATRY & NEUROLOGY

## 2019-07-30 PROCEDURE — 99999 PR PBB SHADOW E&M-EST. PATIENT-LVL III: CPT | Mod: PBBFAC,,, | Performed by: PSYCHIATRY & NEUROLOGY

## 2019-07-30 PROCEDURE — 90833 PSYTX W PT W E/M 30 MIN: CPT | Mod: S$GLB,,, | Performed by: PSYCHIATRY & NEUROLOGY

## 2019-07-30 PROCEDURE — 3008F BODY MASS INDEX DOCD: CPT | Mod: CPTII,S$GLB,, | Performed by: PSYCHIATRY & NEUROLOGY

## 2019-07-30 PROCEDURE — 90833 PR PSYCHOTHERAPY W/PATIENT W/E&M, 30 MIN (ADD ON): ICD-10-PCS | Mod: S$GLB,,, | Performed by: PSYCHIATRY & NEUROLOGY

## 2019-07-30 PROCEDURE — 99999 PR PBB SHADOW E&M-EST. PATIENT-LVL III: ICD-10-PCS | Mod: PBBFAC,,, | Performed by: PSYCHIATRY & NEUROLOGY

## 2019-07-30 RX ORDER — METHYLPHENIDATE HYDROCHLORIDE 10 MG/1
10 TABLET ORAL 2 TIMES DAILY
Qty: 60 TABLET | Refills: 0 | Status: SHIPPED | OUTPATIENT
Start: 2019-09-28

## 2019-07-30 RX ORDER — METHYLPHENIDATE HYDROCHLORIDE 10 MG/1
10 TABLET ORAL 2 TIMES DAILY
Qty: 60 TABLET | Refills: 0 | Status: SHIPPED | OUTPATIENT
Start: 2019-08-29

## 2019-07-30 RX ORDER — VENLAFAXINE HYDROCHLORIDE 75 MG/1
75 CAPSULE, EXTENDED RELEASE ORAL DAILY
Qty: 90 CAPSULE | Refills: 3 | Status: SHIPPED | OUTPATIENT
Start: 2019-07-30

## 2019-07-30 RX ORDER — METHYLPHENIDATE HYDROCHLORIDE 10 MG/1
10 TABLET ORAL 2 TIMES DAILY
Qty: 60 TABLET | Refills: 0 | Status: SHIPPED | OUTPATIENT
Start: 2019-07-30

## 2019-07-30 NOTE — PROGRESS NOTES
Outpatient Psychiatry Follow-Up Visit (MD/NP)    7/30/2019    Clinical Status of Patient:  Outpatient (Ambulatory)     Session Length:  30 mins (E&M plus psychotherapy)     Chief Complaint:  Caridad Panda is a 34 y.o. female who presents today for follow-up of anxiety and adjustment problems.    Met with patient.      Interval History and Content of Current Session:  Interim Events/Subjective Report/Content of Current Session:  First f/u with me since appt on 4/2/2019 -- please see my note in pt's Epic medical record.      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    She talks about all of the arrangements she has had to make in regards to the move.    She also has an aunt who lives in [place] and a good friend who lives in [place], [place].    She will NOT be working.    She will be able to get in state tuition because she can claim an in state residence.    Her grandfather is basically loaning her the money with no interest for her first semester.    However, she is also thinking about getting a federal loan and putting this money in the bank to have a reserve to use for unanticipated expenses.       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    She is currently still living in [place], but is moving to [place] as noted above.     She states she had been taking the Ritalin just when she is studying.  It has helped well with concentration and studying.  She denies AE's.   She has NOT needed any medication lately for sleep.    She continues to take the Effexor XR 75 mg capsule nightly.  If she forgets to take the dose the night before, she will have a bad migraine with nausea the next AM.    She also has PMDD -- gets more anxious, emotionally reactive for a few days prior to menses each month.  She takes Prozac 20 mg on those days and thinks it helps (averages about 5 - 7 days a month that she takes the  Prozac).     She sees Dr. Monahan (neurologist) at [facility] to help manage her migraine headaches.  She states she had her first migraine headache in 4th grade.    At times she has had severe migraines, has had to take ibuprofen, lie down and sleep to break it.    She snores, likely has JAVIER.  Her mom and brother also have JAVIER.  I have encouraged her to see a sleep specialist.      She had met with a SW (Estephanie Dunaway, [facility]; #[phone]) for psychotherapy (not meeting with her recently due to cost).         From previous sessions:  Her mom often refuses to take her medication for no apparent reason.  She and her brother both have confronted her mom on numerous occasions when this has happened.    Her mom had a CVA in the past.  She needs to take meds to help prevent having another CVA.  She also has OCD and depression -- she is supposed to take an SSRI from the mental health clinic where she goes.   Pt has denied any personal Hx of heart disease.  Regarding family cardiac Hx: her mom has Hx of a-fib and her maternal gm had a recent TIA.    Her father  from an MI in his 40's and her paternal gf  from MI in his 50's.     Her brother has been evaluated by a cardiologist as a precaution because of family history (he currently has no signs of heart disease).        PSYCHOTHERAPY ADD-ON [id number]   30 (16-37*) minutes    Site: [facility], [address]  Time: 20 minutes  Participants: Met with patient    Therapeutic Intervention Type: insight oriented psychotherapy, supportive psychotherapy  Why chosen therapy is appropriate versus another modality: relevant to diagnosis, patient responds to this modality    Target symptoms: depression, distractability, lack of focus, anxiety , adjustment, work stress  Primary focus: see above  Psychotherapeutic techniques: encouraged self-disclosure, active listening and feedback, reframing, encouragement of self care    Outcome monitoring methods: self-report, lab data,  observation    Patient's response to intervention:  The patient's response to intervention is motivated.    Progress toward goals:  The patient's progress toward goals is fair .      Review of Systems   GENERAL:  No significant wt gain or loss   SKIN:  No rashes or lacerations  HEAD:  No headaches  EYES:  No exophthalmos, jaundice or blindness  EARS:  No dizziness, tinnitus or hearing loss  NOSE:  No changes in smell  MOUTH & THROAT:  No dyskinetic movements or obvious goiter  CHEST:  No shortness of breath, hyperventilation or cough  CARDIOVASCULAR:  No tachycardia or chest pain  ABDOMEN:  No nausea, vomiting, pain, constipation or diarrhea  URINARY:  No frequency, dysuria or sexual dysfunction  ENDOCRINE:  No polydipsia, polyuria  MUSCULOSKELETAL:  No pain or stiffness of the joints  NEUROLOGIC:  No weakness, sensory changes, seizures, confusion, memory loss, tremor or other abnormal movements.     Past Psychiatric History:  Took Prozac and Zoloft in past (Prozac as a teen with ADHD).  Also, tried Lexapro, but this med did not work.      Past Medical, Family and Social History: The patient's past medical, family and social history have been reviewed and updated as appropriate within the electronic medical record -- see encounter notes.    Current Psychotropic Medications:    Effexor XR 75 mg daily  Prozac 20 mg daily as needed (FOR PMDD; TAKES 1 OUT OF EVERY 4 WEEKS)   Vistaril 25 mg QID prn anxiety   Ritalin 10 mg IR BID (TAKES PRN FOR INATTENTION)  (zaleplon had been Rx to pt over a year ago; pt has not needed to take a sleep aid recently)    Compliance:  Yes.      Side effects: None    Risk Parameters:  Patient reports no suicidal ideation  Patient reports no homicidal ideation  Patient reports no self-injurious behavior  Patient reports no violent behavior    Exam (detailed: at least 9 elements; comprehensive: all 15 elements)   Constitutional  Vitals:  Most recent vital signs, dated less than 90 days prior to  "this appointment, were reviewed.     Vitals - 1 value per visit 5/16/2019 6/21/2019 7/30/2019   SYSTOLIC 131 136 134   DIASTOLIC 87 84 79   PULSE 97 94 104   TEMPERATURE 97.5     RESPIRATIONS 16     SPO2 100 98    Weight (lb) 179 172.62 175.38   Weight (kg) 81.194 78.3 79.55   HEIGHT 5' 3"  5' 3"   BODY MASS INDEX 31.71 30.58 31.07   VISIT REPORT      Pain Score   0        Vitals - 1 value per visit 10/31/2018 2/6/2019 2/27/2019 4/2/2019   SYSTOLIC 135 126 130 123   DIASTOLIC 85 62 81 75   PULSE 94 91 92 88   TEMPERATURE   97.4    RESPIRATIONS   19    SPO2   97    Weight (lb) 182.54 181.22 180 179.9   Weight (kg) 82.8 82.2 81.647 81.6   HEIGHT 5' 3" 5' 3" 5' 3" 5' 3"   BODY MASS INDEX 32.34 32.1 31.89 31.87   VISIT REPORT       Pain Score            Vitals - 1 value per visit 3/26/2018 5/30/2018 6/18/2018 8/22/2018   SYSTOLIC 130 131  133   DIASTOLIC 83 84  74   PULSE 106 95  88   Weight (lb) 177.91 181.22 180.78 181.33   Weight (kg) 80.7 82.2 82 82.25   HEIGHT 5' 4" 5' 4" 5' 3" 5' 3"   BODY MASS INDEX 30.54 31.11 32.02 32.12   VISIT REPORT       Pain Score    0        Vitals - 1 value per visit 11/15/2017 1/10/2018 1/15/2018   SYSTOLIC 130 135 116   DIASTOLIC 80 78 88   PULSE  99    Weight (lb) 181 183 182.32   Weight (kg) 82.1 83.008 82.7   HEIGHT 5' 4" 5' 4" 5' 4"   BODY MASS INDEX 31.07 31.41 31.3   VISIT REPORT      Pain Score  0  0       Vitals - 1 value per visit 6/26/2017 7/19/2017 10/18/2017   SYSTOLIC 118 139 137   DIASTOLIC 76 86 83   PULSE  105 84   Weight (lb) 178.57 180 180   Weight (kg) 81 81.647 81.647   HEIGHT 5' 4" 5' 4" 5' 4"   BODY MASS INDEX 30.65 30.9 30.9   VISIT REPORT      Pain Score  0          General:  unremarkable, age appropriate, well nourished, casually dressed, neatly groomed, pleasant, cooperative, good eye contact, engages well in conversation     Musculoskeletal  Muscle Strength/Tone:  not examined   Gait & Station:  non-ataxic     Psychiatric  Speech:  no latency; no press, " "spontaneous, good articulation   Mood & Affect:  mildly anxious  congruent and appropriate   Thought Process:  goal-directed, mildly circumstantial   Associations:  mildly circumstantial   Thought Content:  normal, no suicidality, no homicidality, delusions, or paranoia   Insight:  intact, has awareness of illness   Judgement: behavior is adequate to circumstances   Orientation:  grossly intact   Memory: intact for content of interview   Language: grossly intact   Attention Span & Concentration:  able to focus   Fund of Knowledge:  intact and appropriate to age and level of education     Assessment and Diagnosis   Status/Progress: Based on the examination today, the patient's problem(s) is/are adequately but not ideally controlled.  New problems have been presented today.   Co-morbidities are not complicating management of the primary condition.  There are no active rule-out diagnoses for this patient at this time.     General Impression:   ADHD, inattentive type  Anxiety Disorder, unspecified  Pre-menstrual dysphoric disorder  Depressive Disorder, unspecified (R/O Hx of dysthymic disorder or MDD)     Intervention/Counseling/Treatment Plan   Medication Management:  Continue Ritalin IR 10 mg TWICE DAILY as needed for concentration.  Three Rx for Ritalin, for a 30 day supply with no refills & with "Do not fill until" date posted accordingly, were given to patient.   Risks and benefits of psychostimulants have been noted, including potential increased anxiety/agitation, tremor, initial insomnia, potential rapid cycling of mood in a bipolar patient, and possible tolerance and dependence to develop.  Also, strict LEIGH regulations for this medication have been noted, as well as recommendation to keep the medication secured, as no Rx would be written for patient if the Rx or supply of medication were lost or stolen.    --Continue venlafaxine 75 mg one capsule daily.   --Continue Prozac 20 mg one capsule daily PRN for PMDD.  "   --Continue Vistaril 25 mg one cap QID PRN anxiety.      Return to Clinic:  PRN.  Pt is moving to [place] for school.  I have encouraged her to f/u with a mental health specialist there for future psychiatric care, including medication management.

## 2019-09-13 DIAGNOSIS — J30.9 ALLERGIC RHINITIS, UNSPECIFIED SEASONALITY, UNSPECIFIED TRIGGER: ICD-10-CM

## 2019-09-13 RX ORDER — AZELASTINE 1 MG/ML
SPRAY, METERED NASAL
Qty: 90 ML | Refills: 0 | Status: SHIPPED | OUTPATIENT
Start: 2019-09-13

## 2023-04-02 NOTE — PROGRESS NOTES
"Outpatient Psychiatry Follow-Up Visit (MD/NP)    3/26/2018    Clinical Status of Patient:  Outpatient (Ambulatory)     Session Length:  30 mins (E&M plus psychotherapy)     Chief Complaint:  Caridad Panda is a 32 y.o. female who presents today for follow-up of anxiety and adjustment problems.    Met with patient.      Interval History and Content of Current Session:  Interim Events/Subjective Report/Content of Current Session:  First f/u with me since appt on 1/10/2018 -- please see my note in pt's Epic medical record.      Pt states she has a new job; she just started 4 weeks ago.  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  She works 8 hours, 4 days a week, plus one Friday out of every 3 Fridays.    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  She is also NOT doing the insurance now, for which she is very happy.      On the weekends, "all I want to do is sleep".  She is not sure why she is doing this.  She thinks she is getting enough rest during the week.    She has slacked off on working out/exercising.    She has NOT been studying.  She has NOT signed up for the DONNA again soon (perhaps at end of the month?).  She scored a 17 the last time she took this test, which is below the score she needed.    She did apply to dental hygiene school.    She was upset with the fact she never received a transcript after she completed her last set of courses at dental school.  She did not think that some of her dental courses would be on her transcript because she did not complete the courses.    She states she has already explained why she had to drop out of dental school.      She states her original class in which she was enrolled just took their board exams.  She knows if everything had worked out right the first time, she would be graduating in May 2018, so this upsets her.  " Chief complaint:   Chief Complaint   Patient presents with   • Wrist Injury     Left  Rm 11       Vitals:  Visit Vitals  /68 (BP Location: LUE - Left upper extremity, Patient Position: Sitting)   Pulse 74   Temp 98 °F (36.7 °C) (Tympanic)   Resp 16   SpO2 97%       HISTORY OF PRESENT ILLNESS     61-year-old female presents to the urgent care for evaluation of left wrist pain since falling on an outstretched hand onto the ice on 03/05.  Patient saw her PCP the following day who felt that injury was likely a sprain.  Patient was advised if symptoms persist then an x-ray would be appropriate.  Patient states she continues to have pain and swelling of the dorsal hypothenar area.  Reports pain with any wrist range of motion or gripping.  Feels weakness when holding on her phone.  Denies paresthesias.  She is not left-handed.  Has been icing and taking anti-inflammatories without relief.        Other significant problems:  Patient Active Problem List    Diagnosis Date Noted   • Myopia OU 10/30/2014     Priority: Low   • Regular astigmatism of both eyes 10/30/2014     Priority: Low   • Presbyopia OU 10/30/2014     Priority: Low   • Fitting and adjustment of spectacles and contact lenses 10/30/2014     Priority: Low   • Myelinated retinal nerve fiber layer OS 10/30/2014     Priority: Low   • Bilateral hand pain 06/05/2013     Priority: Low   • Joint laxity 06/05/2013     Priority: Low   • Polyarthritis 06/05/2013     Priority: Low   • Myalgia and myositis, unspecified 06/05/2013     Priority: Low       PAST MEDICAL, FAMILY AND SOCIAL HISTORY     Medications:  Current Outpatient Medications   Medication Sig Dispense Refill   • fluoxetine (PROZAC) 20 MG tablet TAKE ONE TABLET BY MOUTH DAILY 90 tablet 1   • estrogens, conjugated (PREMARIN) 1.25 MG tablet Take 1.25 mg by mouth daily.     • EPINEPHrine (EpiPen) 0.3 MG/0.3ML Device auto-injector Inject  into the muscle as needed.     • albuterol 108 (90 BASE) MCG/ACT  "  She states she has been taking the Ritalin just when she is studying.  She does not need it for assisting; "it is very task oriented".      She thinks her grandfather had a mild CVA.    Her grandmother has had some TIA's.    She feels frustrated with some of the MD's in [place] where they reside.  She does not think they are aggressive enough when working up these Sx with which they present.    Both are in their late 80's.      Ritalin has helped well with concentration and studying.  She denies AE's.   She has been using the trazodone for sleep.  Occasionally may use Sonata to go back to sleep.    She snores, likely has JAVIER.  Her mom and brother also have JAVIER.  I have encouraged her to see a sleep specialist.  She continues to take the Effexor XR 75 mg capsule nightly.  If she forgets to take the dose the night before, she will have a bad migraine with nausea the next AM.    She also has PMDD -- gets more anxious, emotionally reactive for a few days prior to menses each month.  She takes Prozac 20 mg on those days and thinks it helps (averages about 5 - 7 days a month that she takes the Prozac).     She is still living in [place].      She sees Dr. Monahan (neurologist) at [facility] to help manage her migraine headaches.  She states she had her first migraine headache in 4th grade.    At times she has had severe migraines, has had to take ibuprofen, lie down and sleep to break it.      Pt has denied any personal Hx of heart disease.  Regarding family cardiac Hx: her mom has Hx of a-fib and her maternal gm had a recent TIA.    Her father  from an MI in his 40's and her paternal gf  from MI in his 50's.     Her brother has been evaluated by a cardiologist as a precaution because of family history (he currently has no signs of heart disease).      She had met with a SW (Estephanie Dunaway, [facility]; #[phone]) for psychotherapy (not meeting with her recently due to cost).         PSYCHOTHERAPY ADD-ON [id number]   30 " inhaler Inhale 2 puffs into the lungs every 4 hours as needed.     • melatonin 3 MG TABS Take 3 mg by mouth nightly as needed.     • Multiple Vitamin (MULTIVITAMIN) capsule Take 1 capsule by mouth daily.     • meloxicam (MOBIC) 15 MG tablet Take 1 tablet by mouth daily. 30 tablet 3     No current facility-administered medications for this visit.       Allergies:  ALLERGIES:   Allergen Reactions   • Bee Sting ANAPHYLAXIS   • Grapes [Grape   (Food)] RASH       Past Medical  History/Surgeries:  Past Medical History:   Diagnosis Date   • Arthritis    • Asthma    • Depression        Past Surgical History:   Procedure Laterality Date   • Appendectomy  1982   • Total abdom hysterectomy  11/2011    KARLI w BSO       Family History:  Family History   Problem Relation Age of Onset   • Cancer Mother    • Congestive Heart Failure Father    • Osteoarthritis Father         RA   • Macular degeneration Father    • Hypertension Father    • High cholesterol Father    • Cancer Maternal Grandmother    • Cancer Paternal Grandmother    • Heart disease Maternal Grandfather    • Heart disease Paternal Grandfather        Social History:  Social History     Tobacco Use   • Smoking status: Never   • Smokeless tobacco: Never   Substance Use Topics   • Alcohol use: Yes     Comment: 1 per month       REVIEW OF SYSTEMS     Review of Systems   Musculoskeletal: Positive for arthralgias and joint swelling.   Skin: Negative for rash and wound.   Neurological: Positive for weakness. Negative for numbness.   All other systems reviewed and are negative.      PHYSICAL EXAM     Physical Exam  Vitals and nursing note reviewed.   Constitutional:       General: She is not in acute distress.     Appearance: Normal appearance. She is well-developed. She is not ill-appearing or diaphoretic.   Cardiovascular:      Rate and Rhythm: Normal rate.   Pulmonary:      Effort: Pulmonary effort is normal.   Musculoskeletal:      Left wrist: Swelling (mild dorsolateral  (16-37*) minutes    Site: [facility], [address]  Time: 20 minutes  Participants: Met with patient    Therapeutic Intervention Type: insight oriented psychotherapy, supportive psychotherapy  Why chosen therapy is appropriate versus another modality: relevant to diagnosis, patient responds to this modality    Target symptoms: depression, distractability, lack of focus, anxiety , adjustment, work stress  Primary focus: see above  Psychotherapeutic techniques: encouraged self-disclosure, active listening and feedback, reframing, encouragement of self care    Outcome monitoring methods: self-report, lab data, observation    Patient's response to intervention:  The patient's response to intervention is motivated.    Progress toward goals:  The patient's progress toward goals is fair .      Review of Systems   GENERAL:  Some recent weight loss   SKIN:  No rashes or lacerations  HEAD:  No headaches  EYES:  No exophthalmos, jaundice or blindness  EARS:  No dizziness, tinnitus or hearing loss  NOSE:  No changes in smell  MOUTH & THROAT:  No dyskinetic movements or obvious goiter  CHEST:  No shortness of breath, hyperventilation or cough  CARDIOVASCULAR:  No tachycardia or chest pain  ABDOMEN:  No nausea, vomiting, pain, constipation or diarrhea  URINARY:  No frequency, dysuria or sexual dysfunction  ENDOCRINE:  No polydipsia, polyuria  MUSCULOSKELETAL:  No pain or stiffness of the joints  NEUROLOGIC:  No weakness, sensory changes, seizures, confusion, memory loss, tremor or other abnormal movements.     Past Psychiatric History:  Took Prozac and Zoloft in past (Prozac as a teen with ADHD).  Also, tried Lexapro, but this med did not work.      Past Medical, Family and Social History: The patient's past medical, family and social history have been reviewed and updated as appropriate within the electronic medical record -- see encounter notes.    Current Psychotropic Medications:    Effexor XR 75 mg  wrist), bony tenderness and snuff box tenderness present. No effusion, lacerations or crepitus. Decreased range of motion (passive extension limited). Normal pulse.      Left hand: Normal. Normal range of motion. Normal strength. Normal sensation. Normal capillary refill. Normal pulse.   Skin:     General: Skin is warm and dry.   Neurological:      Mental Status: She is alert and oriented to person, place, and time.   Psychiatric:         Mood and Affect: Mood normal.         Behavior: Behavior normal.         ASSESSMENT/PLAN     XR WRIST 3+ VW LEFT    Result Date: 4/2/2023  Narrative: EXAM: XR WRIST 3+ VW LEFT DATE OF EXAM: 4/2/2023 12:32 PM. CLINICAL HISTORY: FOOSH injury, snuff box tenderness. COMPARISON:  None     Impression: FINDINGS/IMPRESSION: 1. There is a subtle lucency through the scaphoid waist concerning for possible nondisplaced fracture. Recommend short-term follow-up and clinical management. 2. No dislocation. 3. Moderate first CMC joint space narrowing and osteophyte formation, in keeping with OA.      Lidia was seen today for wrist injury.    Diagnoses and all orders for this visit:    Closed nondisplaced fracture of middle third of scaphoid bone of left wrist, initial encounter  -     SERVICE TO HAND SURGERY  -     FIBERGLASS CAST MATERIAL    Left wrist pain  -     XR WRIST 3+ VW LEFT; Future  -     FIBERGLASS CAST MATERIAL      Physical exam finding concerning for scaphoid injury.  Left wrist x-ray shows a subtle lucency through the scaphoid waist concerning for possible nondisplaced fracture.  Patient placed in a fiberglass thumb spica splint.  Patient will need to follow-up with Ortho/Hand surgery in the next few days.    Home care instructions reviewed with the patient, see AVS.    Supervising Physician:   Dr. Kim Watts    "daily  Prozac 20 mg daily as needed (FOR PMDD; TAKES 1 OUT OF EVERY 4 WEEKS)   Vistaril 25 mg QID prn anxiety   Ritalin 10 mg IR BID (TAKES PRN FOR INATTENTION)    Compliance:  Yes.      Side effects: None    Risk Parameters:  Patient reports no suicidal ideation  Patient reports no homicidal ideation  Patient reports no self-injurious behavior  Patient reports no violent behavior    Exam (detailed: at least 9 elements; comprehensive: all 15 elements)   Constitutional  Vitals:  Most recent vital signs, dated less than 90 days prior to this appointment, were reviewed.       Vitals - 1 value per visit 1/10/2018 1/15/2018 3/26/2018   SYSTOLIC 135 116 130   DIASTOLIC 78 88 83   PULSE 99  106   Weight (lb) 183 182.32 177.91   Weight (kg) 83.008 82.7 80.7   HEIGHT 5' 4" 5' 4" 5' 4"   BODY MASS INDEX 31.41 31.3 30.54   VISIT REPORT      Pain Score   0        Vitals - 1 value per visit 6/26/2017 7/19/2017 10/18/2017   SYSTOLIC 118 139 137   DIASTOLIC 76 86 83   PULSE  105 84   Weight (lb) 178.57 180 180   Weight (kg) 81 81.647 81.647   HEIGHT 5' 4" 5' 4" 5' 4"   BODY MASS INDEX 30.65 30.9 30.9   VISIT REPORT      Pain Score  0         Vitals - 1 value per visit 12/8/2016 2/14/2017 5/4/2017   SYSTOLIC 133 132 130   DIASTOLIC 82 81 80   PULSE 109 105 94   Weight (lb) 173 172 178   Weight (kg) 78.472 78.019 80.74   HEIGHT 5' 4" 5' 4" 5' 4"   BODY MASS INDEX 29.7 29.52 30.55   VISIT REPORT      Pain Score            General:  unremarkable, age appropriate, well nourished, casually dressed, neatly groomed, pleasant, cooperative, good eye contact, engages well in conversation     Musculoskeletal  Muscle Strength/Tone:  not examined   Gait & Station:  non-ataxic     Psychiatric  Speech:  no latency; no press, spontaneous, good articulation   Mood & Affect:  mildly anxious  congruent and appropriate   Thought Process:  goal-directed, mildly circumstantial   Associations:  mildly circumstantial   Thought Content:  normal, no " "suicidality, no homicidality, delusions, or paranoia   Insight:  intact, has awareness of illness   Judgement: behavior is adequate to circumstances   Orientation:  grossly intact   Memory: intact for content of interview   Language: grossly intact   Attention Span & Concentration:  able to focus   Fund of Knowledge:  intact and appropriate to age and level of education     Assessment and Diagnosis   Status/Progress: Based on the examination today, the patient's problem(s) is/are adequately but not ideally controlled.  New problems have been presented today.   Co-morbidities are not complicating management of the primary condition.  There are no active rule-out diagnoses for this patient at this time.     General Impression:   ADHD, inattentive type  Anxiety Disorder, unspecified  PMDD  Depressive Disorder, unspecified (R/O Hx of dysthymic disorder or MDD)     Intervention/Counseling/Treatment Plan   Medication Management:  Continue Ritalin IR 10 mg TWICE DAILY as needed for concentration.  Risks and benefits of psychostimulants have been noted, including potential increased anxiety/agitation, tremor, initial insomnia, potential rapid cycling of mood in a bipolar patient, and possible tolerance and dependence to develop.  Also, strict [place] regulations for this medication have been noted, as well as recommendation to keep the medication secured, as no Rx would be written for patient if the Rx or supply of medication were lost or stolen.  Three Rx for Ritalin, for a 30 day supply with no refills & with "Do not fill until" date posted accordingly, were given to patient.   --Continue Prozac 20 mg one capsule daily PRN for PMDD.    --Continue other current medications as noted above.     Return to Clinic: 3 months, or sooner prn.    "